# Patient Record
Sex: FEMALE | Race: ASIAN | NOT HISPANIC OR LATINO | Employment: PART TIME | URBAN - METROPOLITAN AREA
[De-identification: names, ages, dates, MRNs, and addresses within clinical notes are randomized per-mention and may not be internally consistent; named-entity substitution may affect disease eponyms.]

---
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
## 2019-06-13 ENCOUNTER — HOSPITAL ENCOUNTER (OUTPATIENT)
Dept: ULTRASOUND IMAGING | Facility: HOSPITAL | Age: 19
Discharge: HOME | End: 2019-06-13
Attending: PHYSICIAN ASSISTANT

## 2019-06-13 ENCOUNTER — PRENATAL OFFICE VISIT - HEALTHEAST (OUTPATIENT)
Dept: FAMILY MEDICINE | Facility: CLINIC | Age: 19
End: 2019-06-13

## 2019-06-13 DIAGNOSIS — Z32.01 PREGNANCY TEST POSITIVE: ICD-10-CM

## 2019-06-13 DIAGNOSIS — Z34.00 SUPERVISION OF NORMAL FIRST PREGNANCY, ANTEPARTUM: ICD-10-CM

## 2019-06-13 DIAGNOSIS — N92.6 ABNORMAL MENSES: ICD-10-CM

## 2019-06-13 LAB
ALBUMIN UR-MCNC: NEGATIVE MG/DL
AMPHETAMINES UR QL SCN: NORMAL
BARBITURATES UR QL: NORMAL
BASOPHILS # BLD AUTO: 0 THOU/UL (ref 0–0.2)
BASOPHILS NFR BLD AUTO: 0 % (ref 0–2)
BENZODIAZ UR QL: NORMAL
CANNABINOIDS UR QL SCN: NORMAL
COCAINE UR QL: NORMAL
COLLECTION METHOD: NORMAL
CREAT UR-MCNC: 48.4 MG/DL
EOSINOPHIL # BLD AUTO: 0.1 THOU/UL (ref 0–0.4)
EOSINOPHIL NFR BLD AUTO: 1 % (ref 0–6)
ERYTHROCYTE [DISTWIDTH] IN BLOOD BY AUTOMATED COUNT: 13.1 % (ref 11–14.5)
GLUCOSE UR STRIP-MCNC: NEGATIVE MG/DL
HBV SURFACE AG SERPL QL IA: NEGATIVE
HCG UR QL: POSITIVE
HCT VFR BLD AUTO: 40.1 % (ref 35–47)
HGB BLD-MCNC: 13.4 G/DL (ref 12–16)
HIV 1+2 AB+HIV1 P24 AG SERPL QL IA: NEGATIVE
KETONES UR STRIP-MCNC: NEGATIVE MG/DL
LEAD BLD-MCNC: NORMAL UG/DL
LEAD RETEST: NO
LYMPHOCYTES # BLD AUTO: 2.2 THOU/UL (ref 0.8–4.4)
LYMPHOCYTES NFR BLD AUTO: 21 % (ref 20–40)
MCH RBC QN AUTO: 31.5 PG (ref 27–34)
MCHC RBC AUTO-ENTMCNC: 33.4 G/DL (ref 32–36)
MCV RBC AUTO: 94 FL (ref 80–100)
MONOCYTES # BLD AUTO: 0.7 THOU/UL (ref 0–0.9)
MONOCYTES NFR BLD AUTO: 6 % (ref 2–10)
NEUTROPHILS # BLD AUTO: 7.6 THOU/UL (ref 2–7.7)
NEUTROPHILS NFR BLD AUTO: 72 % (ref 50–70)
OPIATES UR QL SCN: NORMAL
OXYCODONE UR QL: NORMAL
PCP UR QL SCN: NORMAL
PLATELET # BLD AUTO: 219 THOU/UL (ref 140–440)
PMV BLD AUTO: 10.6 FL (ref 8.5–12.5)
RBC # BLD AUTO: 4.25 MILL/UL (ref 3.8–5.4)
WBC: 10.6 THOU/UL (ref 4–11)

## 2019-06-14 LAB
ABO/RH(D): NORMAL
ABORH REPEAT: NORMAL
ANTIBODY SCREEN: NEGATIVE
BACTERIA SPEC CULT: NO GROWTH
LEAD BLDV-MCNC: <2 UG/DL (ref 0–4.9)
RUBV IGG SERPL QL IA: POSITIVE
T PALLIDUM AB SER QL: NEGATIVE

## 2019-07-02 ENCOUNTER — PRENATAL OFFICE VISIT - HEALTHEAST (OUTPATIENT)
Dept: FAMILY MEDICINE | Facility: CLINIC | Age: 19
End: 2019-07-02

## 2019-07-02 ENCOUNTER — RECORDS - HEALTHEAST (OUTPATIENT)
Dept: ADMINISTRATIVE | Facility: OTHER | Age: 19
End: 2019-07-02

## 2019-07-02 DIAGNOSIS — Z34.01 SUPERVISION OF NORMAL FIRST PREGNANCY IN FIRST TRIMESTER: ICD-10-CM

## 2019-07-02 DIAGNOSIS — Z36.9 ANTENATAL SCREENING ENCOUNTER: ICD-10-CM

## 2019-07-02 LAB
ALBUMIN UR-MCNC: NEGATIVE MG/DL
GLUCOSE UR STRIP-MCNC: NEGATIVE MG/DL
KETONES UR STRIP-MCNC: NEGATIVE MG/DL

## 2019-07-02 ASSESSMENT — MIFFLIN-ST. JEOR: SCORE: 1437.87

## 2019-07-03 ENCOUNTER — HOSPITAL ENCOUNTER (OUTPATIENT)
Dept: ULTRASOUND IMAGING | Facility: HOSPITAL | Age: 19
Discharge: HOME | End: 2019-07-03
Attending: FAMILY MEDICINE

## 2019-07-03 DIAGNOSIS — Z34.01 SUPERVISION OF NORMAL FIRST PREGNANCY IN FIRST TRIMESTER: ICD-10-CM

## 2019-07-03 LAB
C TRACH DNA SPEC QL PROBE+SIG AMP: NEGATIVE
N GONORRHOEA DNA SPEC QL NAA+PROBE: NEGATIVE

## 2019-07-05 ENCOUNTER — COMMUNICATION - HEALTHEAST (OUTPATIENT)
Dept: FAMILY MEDICINE | Facility: CLINIC | Age: 19
End: 2019-07-05

## 2019-07-06 LAB
# FETUSES US: NORMAL
AFP MOM SERPL: 2.09
AFP SERPL-MCNC: 101 NG/ML
AGE - REPORTED: 20.8 YR
CURRENT SMOKER: NO
FAMILY MEMBER DISEASES HX: NO
GA METHOD: NORMAL
GA: NORMAL WK
IDDM PATIENT QL: NO
INTEGRATED SCN PATIENT-IMP: NORMAL
SPECIMEN DRAWN SERPL: NORMAL

## 2019-07-30 ENCOUNTER — PRENATAL OFFICE VISIT - HEALTHEAST (OUTPATIENT)
Dept: FAMILY MEDICINE | Facility: CLINIC | Age: 19
End: 2019-07-30

## 2019-07-30 DIAGNOSIS — Z34.02 SUPERVISION OF NORMAL FIRST PREGNANCY IN SECOND TRIMESTER: ICD-10-CM

## 2019-08-27 ENCOUNTER — PRENATAL OFFICE VISIT - HEALTHEAST (OUTPATIENT)
Dept: FAMILY MEDICINE | Facility: CLINIC | Age: 19
End: 2019-08-27

## 2019-08-27 DIAGNOSIS — Z34.03 SUPERVISION OF NORMAL FIRST PREGNANCY IN THIRD TRIMESTER: ICD-10-CM

## 2019-09-24 ENCOUNTER — PRENATAL OFFICE VISIT - HEALTHEAST (OUTPATIENT)
Dept: FAMILY MEDICINE | Facility: CLINIC | Age: 19
End: 2019-09-24

## 2019-09-24 DIAGNOSIS — Z34.93 SUPERVISION OF NORMAL PREGNANCY IN THIRD TRIMESTER: ICD-10-CM

## 2019-09-24 LAB
ALBUMIN UR-MCNC: NEGATIVE MG/DL
FASTING STATUS PATIENT QL REPORTED: NO
GLUCOSE 1H P 50 G GLC PO SERPL-MCNC: 149 MG/DL (ref 70–139)
GLUCOSE UR STRIP-MCNC: NEGATIVE MG/DL
HGB BLD-MCNC: 12.8 G/DL (ref 12–16)
KETONES UR STRIP-MCNC: NEGATIVE MG/DL

## 2019-09-24 ASSESSMENT — MIFFLIN-ST. JEOR: SCORE: 1524.68

## 2019-09-25 ENCOUNTER — COMMUNICATION - HEALTHEAST (OUTPATIENT)
Dept: FAMILY MEDICINE | Facility: CLINIC | Age: 19
End: 2019-09-25

## 2019-09-25 LAB — T PALLIDUM AB SER QL: NEGATIVE

## 2019-09-27 ENCOUNTER — AMBULATORY - HEALTHEAST (OUTPATIENT)
Dept: LAB | Facility: CLINIC | Age: 19
End: 2019-09-27

## 2019-09-27 ENCOUNTER — COMMUNICATION - HEALTHEAST (OUTPATIENT)
Dept: FAMILY MEDICINE | Facility: CLINIC | Age: 19
End: 2019-09-27

## 2019-09-27 DIAGNOSIS — Z34.93 SUPERVISION OF NORMAL PREGNANCY IN THIRD TRIMESTER: ICD-10-CM

## 2019-09-27 LAB
FASTING STATUS PATIENT QL REPORTED: YES
GLUCOSE 1H P 100 G GLC PO SERPL-MCNC: 151 MG/DL
GLUCOSE 2H P 100 G GLC PO SERPL-MCNC: 127 MG/DL
GLUCOSE 3H P 100 G GLC PO SERPL-MCNC: 143 MG/DL
GLUCOSE P FAST SERPL-MCNC: 87 MG/DL

## 2019-10-22 ENCOUNTER — PRENATAL OFFICE VISIT - HEALTHEAST (OUTPATIENT)
Dept: FAMILY MEDICINE | Facility: CLINIC | Age: 19
End: 2019-10-22

## 2019-10-22 DIAGNOSIS — Z34.93 THIRD TRIMESTER PREGNANCY: ICD-10-CM

## 2019-10-22 LAB
ALBUMIN UR-MCNC: NEGATIVE MG/DL
APPEARANCE UR: CLEAR
BACTERIA #/AREA URNS HPF: ABNORMAL HPF
BILIRUB UR QL STRIP: NEGATIVE
COLOR UR AUTO: YELLOW
GLUCOSE UR STRIP-MCNC: NEGATIVE MG/DL
HGB UR QL STRIP: NEGATIVE
KETONES UR STRIP-MCNC: NEGATIVE MG/DL
LEUKOCYTE ESTERASE UR QL STRIP: ABNORMAL
NITRATE UR QL: NEGATIVE
PH UR STRIP: 7 [PH] (ref 5–8)
RBC #/AREA URNS AUTO: ABNORMAL HPF
SP GR UR STRIP: 1.02 (ref 1–1.03)
SQUAMOUS #/AREA URNS AUTO: ABNORMAL LPF
UROBILINOGEN UR STRIP-ACNC: ABNORMAL
WBC #/AREA URNS AUTO: ABNORMAL HPF

## 2019-10-22 ASSESSMENT — MIFFLIN-ST. JEOR: SCORE: 1538.8

## 2019-10-23 ENCOUNTER — HOSPITAL ENCOUNTER (OUTPATIENT)
Dept: ULTRASOUND IMAGING | Facility: HOSPITAL | Age: 19
Discharge: HOME | End: 2019-10-23
Attending: FAMILY MEDICINE

## 2019-10-23 ENCOUNTER — COMMUNICATION - HEALTHEAST (OUTPATIENT)
Dept: FAMILY MEDICINE | Facility: CLINIC | Age: 19
End: 2019-10-23

## 2019-10-23 DIAGNOSIS — Z34.93 THIRD TRIMESTER PREGNANCY: ICD-10-CM

## 2019-10-30 ENCOUNTER — PRENATAL OFFICE VISIT - HEALTHEAST (OUTPATIENT)
Dept: FAMILY MEDICINE | Facility: CLINIC | Age: 19
End: 2019-10-30

## 2019-10-30 DIAGNOSIS — Z34.93 SUPERVISION OF NORMAL PREGNANCY IN THIRD TRIMESTER: ICD-10-CM

## 2019-10-31 LAB
ALLERGIC TO PENICILLIN: NO
GP B STREP DNA SPEC QL NAA+PROBE: NEGATIVE

## 2019-11-07 ENCOUNTER — PRENATAL OFFICE VISIT - HEALTHEAST (OUTPATIENT)
Dept: FAMILY MEDICINE | Facility: CLINIC | Age: 19
End: 2019-11-07

## 2019-11-07 DIAGNOSIS — Z34.93 SUPERVISION OF NORMAL PREGNANCY IN THIRD TRIMESTER: ICD-10-CM

## 2019-11-07 DIAGNOSIS — K59.00 CONSTIPATION, UNSPECIFIED CONSTIPATION TYPE: ICD-10-CM

## 2019-11-10 ENCOUNTER — HOME CARE/HOSPICE - HEALTHEAST (OUTPATIENT)
Dept: HOME HEALTH SERVICES | Facility: HOME HEALTH | Age: 19
End: 2019-11-10

## 2019-11-12 ENCOUNTER — HOME CARE/HOSPICE - HEALTHEAST (OUTPATIENT)
Dept: HOME HEALTH SERVICES | Facility: HOME HEALTH | Age: 19
End: 2019-11-12

## 2020-01-13 ENCOUNTER — OFFICE VISIT - HEALTHEAST (OUTPATIENT)
Dept: FAMILY MEDICINE | Facility: CLINIC | Age: 20
End: 2020-01-13

## 2021-05-26 VITALS
DIASTOLIC BLOOD PRESSURE: 62 MMHG | TEMPERATURE: 97.8 F | OXYGEN SATURATION: 98 % | SYSTOLIC BLOOD PRESSURE: 114 MMHG | RESPIRATION RATE: 16 BRPM | HEART RATE: 88 BPM

## 2021-05-29 NOTE — PROGRESS NOTES
Chief Complaint:  Chief Complaint   Patient presents with     Pregnancy confirmation     HPI:   Holly Harrell is a 19 y.o. female is here for pregnancy intake.  She is .  Patient's last menstrual period was 01/15/2019.  Negative home pregnancy test in 2019, positive home test in March.  Saw Dr. Almonte in January for leg pain, followed up With rheumatology in May.  Rheumatology work-up grossly negative.  She took diclofenac for her leg pain at some point over the past several months, has stopped now.  She reports her leg pain has improved.    Past medical history: reviewed and updated.  No past medical history on file.  History reviewed. No pertinent surgical history.    Current Outpatient Medications:      prenat.vits,kavya,min-iron-folic Tab, Take 1 tablet by mouth daily., Disp: 100 each, Rfl: 3    Social:  Social History     Tobacco Use     Smoking status: Never Smoker     Smokeless tobacco: Never Used   Substance Use Topics     Alcohol use: Not on file     Drug use: Not on file       OBJECTIVE:  No Known Allergies  Vitals:    19 1119   BP: 115/62   Pulse: 88   Resp: 14     Body mass index is 27.9 kg/m .    Vital signs stable as recorded above  General: Patient is alert and oriented x 3, in no apparent distress  Fetal Exam: Fundal height was palpable at 17 cm, fetal heart tones are heard at 145 bpm.  Patient reports no fetal movement.    Results:  Results for orders placed or performed in visit on 19   Culture, Urine   Result Value Ref Range    Culture No Growth    Pregnancy (Beta-hCG, Qual), Urine   Result Value Ref Range    Pregnancy Test, Urine Positive (!) Negative   ABO/RH Typing (OP order)   Result Value Ref Range    HML ABO/Rh Typing B POS     HML ABO/Rh Repeat Typing B POS    Hepatitis B Surface antigen (HBsAG)   Result Value Ref Range    Hepatitis B Surface Ag Negative Negative   HIV Antigen/Antibody Screening Cascade   Result Value Ref Range    HIV Antigen / Antibody Negative Negative    HML Antibody Screen   Result Value Ref Range    HML Antibody Screen Negative Negative   Lead, Blood   Result Value Ref Range    Lead  <5.0 ug/dL    Collection Method Venous     Lead Retest No    RPR   Result Value Ref Range    Treponema Antibody (Syphilis) Negative Negative   Rubella Immune Status (IgG)   Result Value Ref Range    Rubella Antibody, IgG Positive    Drugs of Abuse 1,Urine   Result Value Ref Range    Amphetamines Screen Negative Screen Negative    Benzodiazepines Screen Negative Screen Negative    Opiates Screen Negative Screen Negative    Phencyclidine Screen Negative Screen Negative    THC Screen Negative Screen Negative    Barbiturates Screen Negative Screen Negative    Cocaine Metabolite Screen Negative Screen Negative    Oxycodone Screen Negative Screen Negative    Creatinine, Urine 48.4 mg/dL   Urinalysis, OB Screen   Result Value Ref Range    Glucose, UA Negative Negative    Ketones, UA Negative Negative    Protein, UA Negative Negative mg/dL   HM1 (CBC with Diff)   Result Value Ref Range    WBC 10.6 4.0 - 11.0 thou/uL    RBC 4.25 3.80 - 5.40 mill/uL    Hemoglobin 13.4 12.0 - 16.0 g/dL    Hematocrit 40.1 35.0 - 47.0 %    MCV 94 80 - 100 fL    MCH 31.5 27.0 - 34.0 pg    MCHC 33.4 32.0 - 36.0 g/dL    RDW 13.1 11.0 - 14.5 %    Platelets 219 140 - 440 thou/uL    MPV 10.6 8.5 - 12.5 fL    Neutrophils % 72 (H) 50 - 70 %    Lymphocytes % 21 20 - 40 %    Monocytes % 6 2 - 10 %    Eosinophils % 1 0 - 6 %    Basophils % 0 0 - 2 %    Neutrophils Absolute 7.6 2.0 - 7.7 thou/uL    Lymphocytes Absolute 2.2 0.8 - 4.4 thou/uL    Monocytes Absolute 0.7 0.0 - 0.9 thou/uL    Eosinophils Absolute 0.1 0.0 - 0.4 thou/uL    Basophils Absolute 0.0 0.0 - 0.2 thou/uL   Lead, Blood, Venouos   Result Value Ref Range    Lead, Blood (Venous) <2.0 0.0 - 4.9 ug/dL     Other screening pregnancy lab work is ordered and pending.    Patient scored a 2/30 on [place]  Depression Screen.    Assessment and Plan:  1.  Pregnancy Intake Appointment.  Holly Harrell is 19 y.o. and .  Patient's last menstrual period was 01/15/2019.  Estimated Date of Delivery: 19  She will be seeing Dr. Wells for OB care.  Screening pregnancy lab work was drawn.  Prenatal vitamins prescribed.  Problem list and current medications reviewed and updated.  Dating ultrasound ordered.  Prenatal info packet given.    2. History of bilateral leg pain.  Seen by rheumatology, generally negative work-up.  Symptoms resolving.    Follow up in 2 weeks.  Please see OB Episode for complete details.  Visit was approximately 30 minutes, greater than 50% of time spent in face-to-face counseling and coordination of care.    This dictation uses voice recognition software, which may contain typographical errors.

## 2021-05-29 NOTE — PATIENT INSTRUCTIONS - HE
Pregnancy: about 20-21 weeks    Due Date: October 22, 2019    Next visit in 2 weeks  With Dr. Wells  For Your First OB Visit

## 2021-05-30 NOTE — TELEPHONE ENCOUNTER
Called and spoke with pt , Message was given, pt understood, no further questions.  Use  line to contact :Marcel ID:[id number]

## 2021-05-30 NOTE — PATIENT INSTRUCTIONS - HE
"  Patient Education   HEALTHY PREGNANCY CARE: 18-22 WEEKS PREGNANT    Your baby is continuing to develop quickly. At this stage, babies can now suck their thumbs,  firmly with their hands, and are beginning to hear.    Sometime between 18 and 22 weeks, you will start to feel your baby move. At first, these small fetal movements feel like fluttering or \"butterflies.\" Some women say that they feel like gas bubbles. As the baby grows, these movements will become stronger and be able to be felt through your abdomen.     Nutrition: During this time, you may find that your nausea and fatigue are gone. Overall, you may feel better and have more energy than you did in your first trimester. Be sure you are getting enough calcium and iron in your diet. Your prenatal vitamins cannot supply all of the nutrients you need, so continue to eat 3-4 servings of dairy foods and 2-3 servings of meat/fish/poultry/nuts every day. Foods high in iron include: red meats, eggs, dark green vegetables, dark yellow vegetables, nuts, kidney beans and chickpeas. Some cereals are fortified with iron, so look at the food labels for 100% of the daily requirement for iron.     [place] for childbirth and parenting classes, including an infant CPR class. Breastfeeding classes are recommended too.    Plan for the gestational diabetes screening between weeks 24-28. You can eat normally before that visit; we would suggest making sure you have protein foods, but not a lot of carbohydrates or sugary foods.    Your blood type was determined at your first OB appointment. If you are Rh negative, you should discuss the need for a Rhogam shot with your midwife or physician.     If you had a  birth in the past, discuss a trial of labor with your midwife or physician. He or she may ask that you obtain your operative report from that  if you are wanting to have a vaginal birth after  () this time.     Think about the support you " have, and what help you can plan on from family and friends, after your baby is born. Many mothers and babies are ready to go home from the hospital within a few days. Your clinic staff is available to assist you and the [facility] staff is available to you after hours. Start preparing your other children for changes they'll experience with the new baby. Explore day care options.    You may find that you have new discomforts now, such as sleep problems or leg cramps. To access information that can help you ease these discomforts, you can refer to the Starting Out Right book or find it online at [email or web address]://www.[employer]org/images/stories/maternity/[facility]-Starting-Out-Right.pdf or http://www.[employer]org/images/stories/flipbooks/[facility]-starting-out-right/[facility]-starting-out-right.html#p=8    You can sign up for a weekly parenting e-mail that gives support, tips and advice from health care professionals that starts with pregnancy and continues through the toddler years. To register, go to [email or web address] at any time during your pregnancy.    Watch for the warning signs of premature labor:     Dull, low backache    Contractions of the uterus, menstrual-like cramps    Abdominal cramping with or without diarrhea    More pelvic pressure    Increase or change in vaginal discharge.     Remember that labor doesn't have to hurt. Never hesitate to call your midwife or physician or their staff at [facility] FAM[facility] MEDICINE/OB at Phone: [phone] for any one of these warning signs - or if something just doesn't feel right. If it's after clinic hours, physician patients should call the [facility] at [phone] ([phone]); midwife patients should call their answering service at [phone].

## 2021-05-30 NOTE — TELEPHONE ENCOUNTER
Attempted #2 voice mail is full. Unable to leave message.   Use  line to contact :Marco A ID:[id number]    Please relay both messages to patient.     Test results suggest normal chromosomes for those tested (21, 18, 13).   Sex chromosome also normal (Boy if patient interested in knowing.)     Notes recorded by Andres Wells MD on 7/8/2019 at 8:09 AM CDT  Call:  The screening blood test for spine problems was normal (low risk).

## 2021-05-30 NOTE — PROGRESS NOTES
Discussed screening for aneuploidy and neural tube defects. Patient wanted to proceed with cell free DNA and MSAFP.    Reviewed intake exam, labs, MEGAN, past medical history, past surgical history, family history, genetic history, and previous obstetrical history.     Thinks 18 lb weight gain already.  Advised 20-30 lb weight gain goal for pregnancy.  Advised exercise.  Discussed reducing calories a little.  Does not take PNV.  Discussed iron and folate intake.    GPs and FOB available to help her with baby.  She does not work or go to school now.

## 2021-05-30 NOTE — TELEPHONE ENCOUNTER
----- Message from Andres Wells MD sent at 7/3/2019  3:22 PM CDT -----  Call:  Ultrasound looks good.

## 2021-05-30 NOTE — PATIENT INSTRUCTIONS - HE
"  Patient Education   HEALTHY PREGNANCY CARE: 18-22 WEEKS PREGNANT    Your baby is continuing to develop quickly. At this stage, babies can now suck their thumbs,  firmly with their hands, and are beginning to hear.    Sometime between 18 and 22 weeks, you will start to feel your baby move. At first, these small fetal movements feel like fluttering or \"butterflies.\" Some women say that they feel like gas bubbles. As the baby grows, these movements will become stronger and be able to be felt through your abdomen.     Nutrition: During this time, you may find that your nausea and fatigue are gone. Overall, you may feel better and have more energy than you did in your first trimester. Be sure you are getting enough calcium and iron in your diet. Your prenatal vitamins cannot supply all of the nutrients you need, so continue to eat 3-4 servings of dairy foods and 2-3 servings of meat/fish/poultry/nuts every day. Foods high in iron include: red meats, eggs, dark green vegetables, dark yellow vegetables, nuts, kidney beans and chickpeas. Some cereals are fortified with iron, so look at the food labels for 100% of the daily requirement for iron.     [place] for childbirth and parenting classes, including an infant CPR class. Breastfeeding classes are recommended too.    Plan for the gestational diabetes screening between weeks 24-28. You can eat normally before that visit; we would suggest making sure you have protein foods, but not a lot of carbohydrates or sugary foods.    Your blood type was determined at your first OB appointment. If you are Rh negative, you should discuss the need for a Rhogam shot with your midwife or physician.     If you had a  birth in the past, discuss a trial of labor with your midwife or physician. He or she may ask that you obtain your operative report from that  if you are wanting to have a vaginal birth after  () this time.     Think about the support you " have, and what help you can plan on from family and friends, after your baby is born. Many mothers and babies are ready to go home from the hospital within a few days. Your clinic staff is available to assist you and the [facility] staff is available to you after hours. Start preparing your other children for changes they'll experience with the new baby. Explore day care options.    You may find that you have new discomforts now, such as sleep problems or leg cramps. To access information that can help you ease these discomforts, you can refer to the Starting Out Right book or find it online at http://www.[employer].org/images/stories/maternity/[facility]-Starting-Out-Right.pdf or http://www.[employer].org/images/stories/flipbooks/[facility]-starting-out-right/[facility]-starting-out-right.html#p=8    You can sign up for a weekly parenting e-mail that gives support, tips and advice from health care professionals that starts with pregnancy and continues through the toddler years. To register, go to [email or web address] at any time during your pregnancy.    Watch for the warning signs of premature labor:     Dull, low backache    Contractions of the uterus, menstrual-like cramps    Abdominal cramping with or without diarrhea    More pelvic pressure    Increase or change in vaginal discharge.     Remember that labor doesn't have to hurt. Never hesitate to call your midwife or physician or their staff at [facility] FAM[facility] MEDICINE/OB at Phone: [phone] for any one of these warning signs - or if something just doesn't feel right. If it's after clinic hours, physician patients should call the [facility] at [phone] ([phone]); midwife patients should call their answering service at [phone].

## 2021-05-30 NOTE — PROGRESS NOTES
23w1d  No ctx, lof, bleeding, or dc.  No HA or vision changes.   Reviewed ultrasound and genetic tests.    /68   Pulse 82   Resp 12   Wt 165 lb (74.8 kg)   LMP 01/15/2019   BMI 29.70 kg/m       No concerns.  Plans to breast feed.

## 2021-05-31 VITALS — WEIGHT: 133 LBS | BODY MASS INDEX: 23.57 KG/M2 | HEIGHT: 63 IN

## 2021-05-31 NOTE — PROGRESS NOTES
27w1d   No ctx, lof, bleeding, or dc.  No HA or vision changes.   /77 (Patient Site: Right Arm, Patient Position: Sitting, Cuff Size: Adult Regular)   Pulse 97   Wt 173 lb (78.5 kg)   LMP 01/15/2019   BMI 31.14 kg/m      1. Supervision of normal first pregnancy in third trimester  Cannot stay for GCT today, so next visit.  - Urinalysis, OB Screen

## 2021-05-31 NOTE — PATIENT INSTRUCTIONS - HE
Patient Education   HEALTHY PREGNANCY CARE: 26-30 WEEKS PREGNANT    You are now in your last trimester of pregnancy. Your baby is growing rapidly, can open and close her eyelids, sometimes get hiccups, and you'll probably feel her moving around more often. Your baby has breathing movements and is gaining about one ounce each day. You may notice heartburn and leg cramps. Your back may ache as your body gets used to your baby's size and length.    Discuss your work situation with your midwife or physician as needed. If you stand for long periods of time, you may need to make changes and take breaks.    Pre-register online at the hospital where your baby will be born ([email or web address].[facility].[email or web address]/maternity/maternity-care-[email or web address])     Be aware of your baby's activity level. You may be asked to do daily fetal movement counts. Contact your midwife or physician about any decreased movement.    You may have been tested for gestational diabetes today. If you are RH negative, you may have had an additional test and a Rhogam injection.    Consider receiving a Tdap vaccination to protect your baby from Pertussis/whooping cough.    If you are considering tubal ligation, discuss this with your midwife or physician. You will need to have an appointment with the obstetrician who will do the surgery to discuss the risks, benefits, and alternatives, and to sign the consent. This can be discussed at your next visit.    Continue to watch for any signs or symptoms of premature labor:     Regular contractions. This means having about 6 or more within 1 hour, even after you have had a glass of water and are resting.     A backache that starts and stops regularly.    An increase or change in vaginal discharge, such as heavy, mucus-like, watery, or bloody discharge.     Your water breaks or leaks.    Continue to watch for signs and symptoms of preeclampsia:     Sudden swelling of your face, hands, or feet     New vision  problems such as blurring, double vision, or flashing lights    A severe headache not relieved with acetaminophen (Tylenol)    Sharp or stabbing pain in your right or middle upper abdomen    If you have any of the above symptoms or any other concerns, call your midwife or physician or their clinic staff at [facility] MEDICINE/OB at Phone: [phone]. If it's after clinic hours, physician patients should call the [facility] at [phone] ([phone]); midwife patients should call their answering service at [phone].    How can you care for yourself at home?   You can refer to the Starting Out Right book or find it online at http://www.[facility].org/images/stories/maternity/[facility]-Starting-Out-Right.pdf or http://www.[facility].org/images/stories/flipbooks/[facility]-starting-out-right/[facility]-starting-out-right.html#p=8     You can sign up for a weekly parenting e-mail that gives support, tips and advice from health care professionals that starts with pregnancy and continues through the toddler years. To register, go to [email or web address] at any time during your pregnancy.      Baby Feeding in the Hospital: Information, Support and Resources    As you prepare for the birth of your child, you will want to consider options for feeding your baby including breast-feeding and/or baby formula. The American Academy of Pediatrics recommends exclusive breast-feeding for the first six months (although any amount of breast-feeding is beneficial).  However, we also understand that breast-feeding is a personal choice and not for everyone. Whether or not you choose to breast-feed, your decision will be respected by our staff.    There are numerous benefits of breast-feeding; here are a few to consider:    Provides antibodies to protect your baby from infections and diseases    The cost: formula can cost over $1,500 per year    Convenience, no warming up or sterilizing bottles and supplies    The  physical contact with breastfeeding can make babies feel secure, warm and comforted    What ever my feeding choice, what can I expect after I deliver my baby?    Your baby will usually be placed skin-to-skin immediately following birth. The skin to skin contact between you and your baby will be a special and memorable time. The bonding and attachment comforts your baby and has a positive effect on baby s brain development.     Having your baby  room in  with you also helps you start to learn your baby s body rhythms and sleep cycle.      You will also begin to learn your baby s cues (signals) that he or she is ready to feed.    When do I start to feed my baby?  As soon as possible after your baby s birth, you will be encouraged to begin feeding.  In the first couple of weeks, your baby will eat often.  Breastfeeding babies usually eat at least 8 times in 24 hours.  Babies fed formula usually eat at least 7 times in 24 hours.      Breast-feeding tips:    Get comfortable and use pillows for support.    Have your baby at the level of your breast, facing you,  tummy to tummy .      Touch your nipple to your baby s lips so you baby s mouth opens wide (rooting reflex).  Aim the nipple toward the roof of your baby s mouth. When your baby opens his or her mouth, pull your baby toward your breast to help your baby  latch on  to your nipple and much of the areola area.    Hand expressing your breast milk can assist with latching your baby to your breast, if needed.    Ask for help, breastfeeding may seem awkward or uncomfortable at first, this is normal. There are numerous resources available at [facility] and beyond.     If your goal is to exclusively breastfeed, avoid using any formula or artificial nipples (including bottles and pacifiers) while you are your baby are learning to breastfeed unless there is a medical reason.       Mixing breastfeeding and formula can interfere with how you begin building  your milk supply.  It can impact how you and your baby  learn  to breastfeeding together and alter the natural growth of  good  bacteria in your baby s stomach.    Delay a pacifier or a bottle in the first few weeks until breastfeeding is well established. This is often around 3 weeks of age.    Ask your nurse to show you how to hand express.   Breast milk can be kept in the refrigerator or freezer for later use.  (over)  Hospital Resources:  [facility] Lactation Clinics located at [facility], [facility] and [facility]  Call: [phone].    Inpatient support    Outpatient appointments    Telephone consultation    Breast-feeding classes available through [employer]      Online Resources:    [email or web address] sign up for free online weekly e-mail    [email or web address]/maternity    Breastfeedingmadesimple.com    [employer].meebee (La Leche League)    Normalfed.com    Womenshealth.gov/breastfeeding    Workandpump.com    Breast-feeding Supplies & Pumps:  Talk to your insurance provider or WIC (Women, Infants and Children) to learn more about options available to you. Recent health insurance changes may include additional coverage for supplies and pumps.    Public Health:  Women, Infants and Children Nutrition program (WIC): provides breast-feeding support and education in addition to formal feeding moms. 426-YWM-2521 or http://www.health.[facility].mn.us/divs/fh/wic    Family Health Home Visiting: Public [facility] Nurse home visits are available. Talk to your provider to see if you qualify. Most [facility] have a program available.    Additional Resources:  La Leche League is an international, nonprofit, nonsectarian organization offering information, education, and support to mothers who want to breast-feed their babies. Local groups offer phone help and monthly meetings. Visit [employer]meebee or [employer].org and us the  Find local support  drop down menu or click on the  Resources   tab.    [place] Breastfeeding Resources: 9-413-297-BABY (9809) toll free    National Breastfeeding Help Line trained breastfeeding peer counselors can help answer common breast-feeding questions by phone. Monday-Friday: English/Irish  3-526- 310-3553 toll free, 1-383.842.9538 (TTY)    [facility] Connection: [phone] ([phone])

## 2021-06-01 VITALS — HEIGHT: 63 IN | BODY MASS INDEX: 22.68 KG/M2 | WEIGHT: 128 LBS

## 2021-06-01 VITALS — BODY MASS INDEX: 24.3 KG/M2 | WEIGHT: 135 LBS

## 2021-06-01 NOTE — TELEPHONE ENCOUNTER
Called and spoke with pt , Message was given, pt understood, no further questions.  Use  line to contact :[facility] ID:[id number]    Appointment made to come in.

## 2021-06-01 NOTE — TELEPHONE ENCOUNTER
"Attempt to call pt, Voice mail full unable to leave message. Will try again later. #2  Use  line to contact :Basilio ID:[id number]    \" Okay to relay message\"      "

## 2021-06-01 NOTE — PROGRESS NOTES
"31w1d   No ctx, lof, bleeding, or dc.  No HA or vision changes.     /58 (Patient Site: Left Arm, Patient Position: Sitting, Cuff Size: Adult Regular)   Pulse 96   Resp 20   Ht 5' 2.83\" (1.596 m)   Wt 176 lb (79.8 kg)   LMP 01/15/2019   BMI 31.35 kg/m       Recent Results (from the past 240 hour(s))   Glucose,Gestational Challenge (1 Hour)    Collection Time: 09/24/19  9:07 AM   Result Value Ref Range    Glucose, Gestational Challenge 1hr 149 (H) 70 - 139 mg/dL    Patient Fasting > 8hrs? No    Hemoglobin    Collection Time: 09/24/19  9:07 AM   Result Value Ref Range    Hemoglobin 12.8 12.0 - 16.0 g/dL   Syphilis Screen, [place]    Collection Time: 09/24/19  9:07 AM   Result Value Ref Range    Treponema Antibody (Syphilis) Negative Negative   Urinalysis, OB Screen    Collection Time: 09/24/19  9:07 AM   Result Value Ref Range    Glucose, UA Negative Negative    Ketones, UA Negative Negative    Protein, UA Negative Negative mg/dL   Glucose, Gestational Challenge Fasting    Collection Time: 09/27/19  8:05 AM   Result Value Ref Range    Glucose, Fasting 87 60-<95 mg/dL    Patient Fasting > 8hrs? Yes    Glucose, Gestational Challenge 1 Hour    Collection Time: 09/27/19  9:10 AM   Result Value Ref Range    Glucose, 1 Hour 151 60-<180 mg/dL   Glucose, Gestational Challenge 2 Hour    Collection Time: 09/27/19 10:10 AM   Result Value Ref Range    Glucose, 2 Hour 127 60-<155 mg/dL   Glucose, Gestational Challenge 3 Hour    Collection Time: 09/27/19 11:10 AM   Result Value Ref Range    Glucose, 3 hour 143 (H) 60-<140 mg/dL     1. Supervision of normal pregnancy in third trimester  - Glucose,Gestational Challenge (1 Hour)  - Hemoglobin  - Syphilis Screen, [place]  - Urinalysis, OB Screen  - Influenza, Seasonal [place], PF =/> 6months  - Tdap vaccine greater than or equal to 8yo IM  - Glucose,Gestational Challenge (3 Hour); Future   "

## 2021-06-01 NOTE — TELEPHONE ENCOUNTER
----- Message from Andres Wells MD sent at 9/25/2019  9:42 AM CDT -----  Call:  Glucose tests was too high.  We need to do another test to check for diabetes during pregnancy.  Needs to schedule fasting 3 hour tests.

## 2021-06-01 NOTE — TELEPHONE ENCOUNTER
Patient Returning Call  Reason for call:  Message from clinic  Information relayed to patient:  Message from Andres Wells MD sent at 9/27/2019  2:51 PM CDT -----  Call:  Good news.  You do not have diabetes based on this test.  Exercise is still important.  Would also advise decreasing intake of rice and noodles  Patient has additional questions:  No  If YES, what are your questions/concerns:  n/a  Okay to leave a detailed message?: No call back needed

## 2021-06-01 NOTE — TELEPHONE ENCOUNTER
"Use  line to contact  ID:[id number]  Called and left message for pt to return call.# 1  \" Okay to relay message\"      "

## 2021-06-01 NOTE — TELEPHONE ENCOUNTER
----- Message from Andres Wells MD sent at 9/27/2019  2:51 PM CDT -----  Call:  Good news.  You do not have diabetes based on this test.  Exercise is still important.  Would also advise decreasing intake of rice and noodles.

## 2021-06-02 ENCOUNTER — OFFICE VISIT - HEALTHEAST (OUTPATIENT)
Dept: FAMILY MEDICINE | Facility: CLINIC | Age: 21
End: 2021-06-02

## 2021-06-02 DIAGNOSIS — Z12.4 SCREENING FOR CERVICAL CANCER: ICD-10-CM

## 2021-06-02 DIAGNOSIS — N92.6 IRREGULAR MENSES: ICD-10-CM

## 2021-06-02 DIAGNOSIS — Z00.00 ROUTINE HEALTH MAINTENANCE: ICD-10-CM

## 2021-06-02 DIAGNOSIS — Z11.59 NEED FOR HEPATITIS C SCREENING TEST: ICD-10-CM

## 2021-06-02 DIAGNOSIS — E66.811 CLASS 1 OBESITY WITH BODY MASS INDEX (BMI) OF 32.0 TO 32.9 IN ADULT, UNSPECIFIED OBESITY TYPE, UNSPECIFIED WHETHER SERIOUS COMORBIDITY PRESENT: ICD-10-CM

## 2021-06-02 DIAGNOSIS — Z01.419 ENCOUNTER FOR GYNECOLOGICAL EXAMINATION WITHOUT ABNORMAL FINDING: ICD-10-CM

## 2021-06-02 DIAGNOSIS — Z13.29 SCREENING FOR THYROID DISORDER: ICD-10-CM

## 2021-06-02 LAB
ALBUMIN SERPL-MCNC: 4.1 G/DL (ref 3.5–5)
ALP SERPL-CCNC: 74 U/L (ref 45–120)
ALT SERPL W P-5'-P-CCNC: 90 U/L (ref 0–45)
AST SERPL W P-5'-P-CCNC: 42 U/L (ref 0–40)
BASOPHILS # BLD AUTO: 0 THOU/UL (ref 0–0.2)
BASOPHILS NFR BLD AUTO: 0 % (ref 0–2)
BILIRUB DIRECT SERPL-MCNC: 0.3 MG/DL
BILIRUB SERPL-MCNC: 0.9 MG/DL (ref 0–1)
EOSINOPHIL # BLD AUTO: 0.3 THOU/UL (ref 0–0.4)
EOSINOPHIL NFR BLD AUTO: 4 % (ref 0–6)
ERYTHROCYTE [DISTWIDTH] IN BLOOD BY AUTOMATED COUNT: 12.2 % (ref 11–14.5)
HCG UR QL: NEGATIVE
HCT VFR BLD AUTO: 45.6 % (ref 35–47)
HGB BLD-MCNC: 14.9 G/DL (ref 12–16)
IMM GRANULOCYTES # BLD: 0 THOU/UL
IMM GRANULOCYTES NFR BLD: 0 %
LYMPHOCYTES # BLD AUTO: 2.7 THOU/UL (ref 0.8–4.4)
LYMPHOCYTES NFR BLD AUTO: 35 % (ref 20–40)
MCH RBC QN AUTO: 29.7 PG (ref 27–34)
MCHC RBC AUTO-ENTMCNC: 32.7 G/DL (ref 32–36)
MCV RBC AUTO: 91 FL (ref 80–100)
MONOCYTES # BLD AUTO: 0.6 THOU/UL (ref 0–0.9)
MONOCYTES NFR BLD AUTO: 8 % (ref 2–10)
NEUTROPHILS # BLD AUTO: 4.1 THOU/UL (ref 2–7.7)
NEUTROPHILS NFR BLD AUTO: 53 % (ref 50–70)
PLATELET # BLD AUTO: 232 THOU/UL (ref 140–440)
PMV BLD AUTO: 9.6 FL (ref 7–10)
PROLACTIN SERPL-MCNC: 10.9 NG/ML (ref 0–20)
PROT SERPL-MCNC: 7.1 G/DL (ref 6–8)
RBC # BLD AUTO: 5.02 MILL/UL (ref 3.8–5.4)
T4 FREE SERPL-MCNC: 1.1 NG/DL (ref 0.7–1.8)
TSH SERPL DL<=0.005 MIU/L-ACNC: 2.09 UIU/ML (ref 0.3–5)
WBC: 7.8 THOU/UL (ref 4–11)

## 2021-06-02 NOTE — TELEPHONE ENCOUNTER
----- Message from Andres Wells MD sent at 10/23/2019  1:48 PM CDT -----  Call:  Growth ultrasound looks good.

## 2021-06-02 NOTE — PROGRESS NOTES
36w2d  Ultrasound reviwed.  Estimated Fetal Weight: 2461 +/- 360 g  EFW Percentile: 47 percent      /62 (Patient Site: Right Arm, Patient Position: Sitting, Cuff Size: Adult Regular)   Pulse 100   Wt 184 lb (83.5 kg)   LMP 01/15/2019   BMI 33.12 kg/m      Recent Results (from the past 24 hour(s))   Urinalysis, OB Screen   Result Value Ref Range    Glucose, UA Negative Negative    Ketones, UA Negative Negative    Protein, UA Negative Negative mg/dL        1. Supervision of normal pregnancy in third trimester  - Urinalysis, OB Screen  - Group B Strep Screen by PCR

## 2021-06-02 NOTE — PATIENT INSTRUCTIONS - HE
Patient Education   HEALTHY PREGNANCY CARE: 34-36 WEEKS PREGNANT    Your baby is gaining about an ounce each day, so healthy nutrition and rest are still very important. Learn about late pregnancy symptoms, such as constipation and backaches. Drinking more fluids and eating more fiber can relieve constipation. The pelvic tilt and a heating pad can ease backaches.    Continue to watch for signs and symptoms of preeclampsia:     Sudden swelling of your face, hands, or feet     New vision problems such as blurring, double vision, or flashing lights    A severe headache not relieved with acetaminophen (Tylenol)    Sharp or stabbing pain in your right or middle upper abdomen    You're almost done with your pregnancy but it's still too soon to have your baby. The goal is to have your baby after 37 weeks, so watch for signs and symptoms of premature labor:     Regular contractions. This means having about 6 or more within 1 hour, even after you have had a glass of water and are resting.     A backache that starts and stops regularly.    An increase or change in vaginal discharge, such as heavy, mucus-like, watery, or bloody discharge.     Your water breaks or leaks.    You will be tested for group B streptococcus (GBS), a type of bacteria found in 10-30% of pregnant women. A woman with GBS can pass it to her baby during delivery. Most babies who get GBS from their mothers do not have any problems, but some babies get very ill and need to be hospitalized for antibiotic treatment. Treating you with antibiotics during labor and delivery helps to prevent infection in your baby.    Review information on postpartum care that you will need after the baby is born.  Discuss your choices and plans for birth control with your midwife or physician.     Postpartum Care  During the days and weeks after the delivery of your baby (postpartum period), your body will change as it returns to its nonpregnant condition. As with pregnancy  "changes, postpartum changes are different for every woman.    Physical changes after childbirth  The changes in your body may include:    Contractions called afterpains shrink the uterus for several days after childbirth. Shrinking of the uterus to its prepregnancy size may take 6 to 8 weeks.    Sore muscles (especially in the arms, neck, or jaw) are common after childbirth. This is because of the hard work of labor and pushing your baby out. The soreness should go away in a few days.    Bleeding and vaginal discharge (lochia) may last for 2 to 8 weeks, and can come and go for about 2 months.    Vaginal soreness, including pain, discomfort, and numbness, is common after vaginal birth. Soreness may be worse if you had a perineal tear or episiotomy.    If you had a  birth (), you may have pain in your lower abdomen and may need pain medicine for 1 to 2 weeks.    Breast engorgement is common around the 3rd or 4th day after delivery, when the breasts begin to fill with milk. This can cause discomfort and swelling. If you are breast feeding, the best treatment is to feed your baby often or pump the milk out. You can also use warm compresses. If you are not breast feeding, placing ice packs on your breasts, taking a hot shower, or using warm compresses may relieve the discomfort.    Be aware of postpartum depression    \"Baby blues\" are common for the first 1 to 2 weeks after birth. You may cry or feel sad or irritable for no reason.     For some women, these feelings last longer and are more intense. This is called postpartum depression.     If your symptoms last for more than a few weeks or you feel very depressed, ask your midwife or physician for help.     Postpartum depression can be treated. Support groups and counseling can help, but sometimes medication is needed.     Discuss follow-up appointments with your midwife or physician, as well. He or she will usually want to see you 6 weeks after the " birth of your baby, sooner if you are having problems.    If you have questions about any symptoms you are experiencing or any other concerns, call your provider or their clinic staff at [facility]/OB at Phone: [phone]. If it's after clinic hours, physician patients should call the [facility] at [phone] ([phone]); midwife patients should call their answering service at [phone].    How can you care for yourself at home?   You can refer to the Starting Out Right book or find it online at http://www.[facility].org/images/stories/http://www.[email or web address]/images/stories/maternity/[email or web address]-[email or web address]-Right.pdf or http://www.[facility].org/images/stories/flipbooks/[facility]-starting-out-right/[facility]-starting-out-right.html#p=8     You can sign up for a weekly parenting e-mail that gives support, tips and advice from health care professionals that starts with pregnancy and continues through the toddler years. To register, go to [email or web address] at any time during your pregnancy.    Making Early Breastfeeding or Chestfeeding Work: What's Important?  Breastfeeding/chestfeeding is important!     It helps keep babies healthy.    Parents who breastfeed/chestfeed have lower risks of breast and ovarian cancer.    It's convenient: the milk is always ready and warm, and there is nothing to mix or prepare for feeding.    Formula is harder for your baby to digest.    It helps you bond with your baby and protects against postpartum depression.  Lots of early skin-to-skin contact with your baby    Place your naked baby with baby's belly against your bare chest. Cover baby's back with a blanket    Start skin-to-skin right after birth, as soon as you are ready    Skin-to-skin:  ? Helps keep baby warm  ? Improves baby's oxygen and blood sugar levels  ? Helps your uterus contract and bleed less  ? Helps baby feel calm and comforted  ? Helps you feel close to baby  ? Helps get  "breastfeeding started. Being close makes latching on easier, and baby may move over to the nipple and latch without help  ? Baby breastfeeds better and longer when skin-to-skin  Placing baby well for good attachment to the breast or chest    Hold your baby close with baby's tummy touching your tummy.    Wait for baby to open mouth wide, then bring baby onto the breast/chest.    Baby should take a big mouthful of breast/chest, not just the nipple. This helps baby get more milk, and the suckling should feel comfortable.    When baby is latched well to the breast/chest, nipples aren't cracked or painful.  Keeping baby near (called \"rooming-in\" at the hospital)    Baby sleeps better and cries less when birth parent is near. Your room is quiet.    We will place your baby safely on their back in their bassinette. This lets you practice safe sleep for your baby while keeping them at your bedside.    Baby feeds more often, which means your milk supply increases faster, and your baby loses less weight.    Parents have an easier time getting to know and bonding with baby.    Parents feel much more confident about baby care and breastfeeding/chestfeeding.    Maternity staff can help at any time.  Feeding on cue    Feeding on cue simply means feeding whenever your baby shows signs of hunger    Crying is a late hunger sign. Feed baby whenever baby wants for as long as baby wants.    Feeding signs: mouth movements, sticking the tongue out, rooting (baby turns toward chest and may open mouth), hand-to-mouth movements    Advantages of feeding on cue:  ? Frequent breastfeeding/chestfeeding in the first weeks after birth gives you a good milk supply for months to come.  ? Babies settle into a relaxing feed more quickly. Babies enjoy feedings more when they don't have to cry to be fed.  ? Feeding is comfort as well as nutrition. Newborns love constant closeness and feeding and can't be held \"too much\" or \"spoiled.\"  ? Newborns need " "small frequent feedings in the first days of life. Just one to three teaspoons fill a new baby's stomach.  ? Responding to feeding cues helps babies gain weight.  Feeding only human milk in the first six months    Colostrum is the first milk that baby gets at birth. The amount of colostrum matches the baby's stomach, so it will not be overfilled.    The small volumes ready at birth are also easier for baby to handle.    All babies lose weight in the first few days. This is simply \"water weight.\"    Introducing food or fluids other than human milk too early can cause problems for breastfeeding/chestfeeding and for your baby's health.    Feeding only human milk maximizes the protection against disease and infections.    Your body knows how much milk to make by how often your baby feeds. If you give your baby formula, your body may not know how much milk to make.    For informational purposes only. Not to replace the advice of your health care provider. Adapted with permission from \"Getting Started with Infant Care and Breastfeeding,\" by FRANCISCA Briscoe, CATRACHITO, Joy Guzmán, RN, IBCLC, Chrissy Solis MD, CATRACHITO, and Ellen Williamson MD, IBCLC. [place] [employer], 2017. Clinically reviewed by [facility]. dakick [id number] - 05/19.        [facility] [place] Breastfeeding Resources       Research shows that human milk offers the best  nutrition and protection for babies. So at [facility],  we care for families and babies in a way that  promotes, teaches and supports lactation. We  support all breastfeeding, chestfeeding and human  milk feeding families, as well as families who can t  breastfeed / chestfeed or who choose not to do so.  A mother or caregiver s own milk is best for a baby,  but if you are unable to breastfeed / chestfeed, we  may suggest pasteurized donor human milk for your  baby while in the hospital.    Lactation support    The following [facility] [place]-affiliated locations offer  individual " outpatient lactation consults. Some  locations offer phone or group lactation consults  with certified lactation consultants. Call to confirm  services and for information and appointments. You  may wish to call your insurance company first to see  if they will cover the cost of a consult.    [facility]: [phone]    [facility]: [phone]    [facility]: [phone]  Offers [place] First Days program, which includes  group lactation visits and one free information session  about delivering your baby at a designated [facility] hospital and the importance and benefits  of breastfeeding and human milk. These group visits  also help patients with feeding concerns and offer  information about other postpartum topics.                For informational purposes only. Not to replace the advice of your health care  provider. Copyright   2005 [facility]. All rights reserved. [employer] 470195 - REV .   [facility] ([place]):  [phone]    [facility]):  [phone] or [phone]    [facility]):  [phone]    [facility]  ([place]): [phone]    [facility] ([place]):  [phone] or [phone]  Includes follow-up visits for caregivers of babies  discharged from the  intensive care unit  (NICU) at [facility].    [facility]):  [phone]    [facility] ([facility],  [facility], primary care clinics):  [phone]  Also offers weight checks, feeding discussions and support with a lactation consultant as a part of free, weekly support group.    [facility]: [phone]  Also offers a free weekly support group.                                                                                                                                                                                                       (continued)   You may also call [place] [facility] at [phone]  for information about [place] and [facility]  locations that offer lactation support. For information  about breastfeeding / chestfeeding and childbirth  classes in the [facility], go to [facility] at [email or web address]. For [facility], go to [email or web address] and click on  Classes and Events at the bottom of the page. Or, call  [phone].    Supplies    You can get breast pumps from the Birth[facility] nurses  at [facility] or [facility]  nurses at [facility]. Call your health  insurance to see if they will cover the cost of the  pump. Tell your nurse you d like a pump. They will  help you fill out the right paperwork. The pump will  be ready for you when you leave the hospital.    If you decide to get a pump after you leave the  hospital, you can get one from our partners at  [facility]. [facility] carries a range of  feeding supplies and pumps. Please call your health  insurance to see if they will cover the cost of the  pump. Then call [facility]  to find out what supplies and pumps are available.  Some stores may deliver the pump to your home.    [facility]:  [email or web address] Other lactation services    [facility] Ow[facility]: [phone] (24 hours a day)  [email or web address]  Offers support for breastfeeding / chestfeeding and  human milk feeding families. Call to find a group in  your area.    National Women [facility] Information [facility]:  [phone]  www.womenshealth.gov/breastfeeding  Offers a breastfeeding / chestfeeding information  line in English and Bangladeshi.    WIC (Women, Infants and Children) Program:  [phone]  Offers breastfeeding / chestfeeding counseling. Call  to find an office near  you.    Milk banking    [facility]  [email or web address]  [phone]  Consider freezing your extra collected milk to donate  to babies in need. Email or call for information.                           If you are deaf or hard of hearing, please let us know. We provide many free services including  sign language interpreters, oral interpreters, TTYs, telephone amplifiers, note takers and written materials.

## 2021-06-02 NOTE — TELEPHONE ENCOUNTER
Called and spoke with Holly Harrell , Message was given, Holly Harrell  understood, no further questions.  Use  line to contact :Saw ID: [id number]

## 2021-06-02 NOTE — PROGRESS NOTES
"35w1d   No ctx, lof, bleeding, or dc.  No HA or vision changes.    BP 92/64 (Patient Site: Right Arm, Patient Position: Sitting, Cuff Size: Adult Regular)   Pulse 92   Temp 97.2  F (36.2  C) (Oral)   Ht 5' 2.5\" (1.588 m)   Wt 180 lb 4 oz (81.8 kg)   LMP 01/15/2019   SpO2 97%   BMI 32.44 kg/m      Recent Results (from the past 48 hour(s))   Urinalysis   Result Value Ref Range    Color, UA Yellow Colorless, Yellow, Straw, Light Yellow    Clarity, UA Clear Clear    Glucose, UA Negative Negative    Bilirubin, UA Negative Negative    Ketones, UA Negative Negative    Specific Gravity, UA 1.020 1.005 - 1.030    Blood, UA Negative Negative    pH, UA 7.0 5.0 - 8.0    Protein, UA Negative Negative mg/dL    Urobilinogen, UA 0.2 E.U./dL 0.2 E.U./dL, 1.0 E.U./dL    Nitrite, UA Negative Negative    Leukocytes, UA Moderate (!) Negative    Bacteria, UA Few (!) None Seen hpf    RBC, UA 0-2 None Seen, 0-2 hpf    WBC, UA 5-10 (!) None Seen, 0-5 hpf    Squam Epithel, UA 5-10 (!) None Seen, 0-5 lpf      1. Third trimester pregnancy  Measures a bit large to will check us.  "

## 2021-06-02 NOTE — PATIENT INSTRUCTIONS - HE
Patient Education   HEALTHY PREGNANCY CARE: 34-36 WEEKS PREGNANT    Your baby is gaining about an ounce each day, so healthy nutrition and rest are still very important. Learn about late pregnancy symptoms, such as constipation and backaches. Drinking more fluids and eating more fiber can relieve constipation. The pelvic tilt and a heating pad can ease backaches.    Continue to watch for signs and symptoms of preeclampsia:     Sudden swelling of your face, hands, or feet     New vision problems such as blurring, double vision, or flashing lights    A severe headache not relieved with acetaminophen (Tylenol)    Sharp or stabbing pain in your right or middle upper abdomen    You're almost done with your pregnancy but it's still too soon to have your baby. The goal is to have your baby after 37 weeks, so watch for signs and symptoms of premature labor:     Regular contractions. This means having about 6 or more within 1 hour, even after you have had a glass of water and are resting.     A backache that starts and stops regularly.    An increase or change in vaginal discharge, such as heavy, mucus-like, watery, or bloody discharge.     Your water breaks or leaks.    You will be tested for group B streptococcus (GBS), a type of bacteria found in 10-30% of pregnant women. A woman with GBS can pass it to her baby during delivery. Most babies who get GBS from their mothers do not have any problems, but some babies get very ill and need to be hospitalized for antibiotic treatment. Treating you with antibiotics during labor and delivery helps to prevent infection in your baby.    Review information on postpartum care that you will need after the baby is born.  Discuss your choices and plans for birth control with your midwife or physician.     Postpartum Care  During the days and weeks after the delivery of your baby (postpartum period), your body will change as it returns to its nonpregnant condition. As with pregnancy  "changes, postpartum changes are different for every woman.    Physical changes after childbirth  The changes in your body may include:    Contractions called afterpains shrink the uterus for several days after childbirth. Shrinking of the uterus to its prepregnancy size may take 6 to 8 weeks.    Sore muscles (especially in the arms, neck, or jaw) are common after childbirth. This is because of the hard work of labor and pushing your baby out. The soreness should go away in a few days.    Bleeding and vaginal discharge (lochia) may last for 2 to 8 weeks, and can come and go for about 2 months.    Vaginal soreness, including pain, discomfort, and numbness, is common after vaginal birth. Soreness may be worse if you had a perineal tear or episiotomy.    If you had a  birth (), you may have pain in your lower abdomen and may need pain medicine for 1 to 2 weeks.    Breast engorgement is common around the 3rd or 4th day after delivery, when the breasts begin to fill with milk. This can cause discomfort and swelling. If you are breast feeding, the best treatment is to feed your baby often or pump the milk out. You can also use warm compresses. If you are not breast feeding, placing ice packs on your breasts, taking a hot shower, or using warm compresses may relieve the discomfort.    Be aware of postpartum depression    \"Baby blues\" are common for the first 1 to 2 weeks after birth. You may cry or feel sad or irritable for no reason.     For some women, these feelings last longer and are more intense. This is called postpartum depression.     If your symptoms last for more than a few weeks or you feel very depressed, ask your midwife or physician for help.     Postpartum depression can be treated. Support groups and counseling can help, but sometimes medication is needed.     Discuss follow-up appointments with your midwife or physician, as well. He or she will usually want to see you 6 weeks after the " birth of your baby, sooner if you are having problems.    If you have questions about any symptoms you are experiencing or any other concerns, call your provider or their clinic staff at [facility]/OB at Phone: [phone]. If it's after clinic hours, physician patients should call the [facility] Connection at [phone] ([phone]); midwife patients should call their answering service at [phone].    How can you care for yourself at home?   You can refer to the Starting Out Right book or find it online at [email or web address]/images/stories/http://www.[email or web address]/images/stories/[email or web address]-[email or web address]-Right.pdf or http://www.[facility].org/images/stories/flipbooks/[facility]-starting-out-right/[facility]-starting-out-right.html#p=8     You can sign up for a weekly parenting e-mail that gives support, tips and advice from health care professionals that starts with pregnancy and continues through the toddler years. To register, go to [email or web address] at any time during your pregnancy.    Making Early Breastfeeding or Chestfeeding Work: What's Important?  Breastfeeding/chestfeeding is important!     It helps keep babies healthy.    Parents who breastfeed/chestfeed have lower risks of breast and ovarian cancer.    It's convenient: the milk is always ready and warm, and there is nothing to mix or prepare for feeding.    Formula is harder for your baby to digest.    It helps you bond with your baby and protects against postpartum depression.  Lots of early skin-to-skin contact with your baby    Place your naked baby with baby's belly against your bare chest. Cover baby's back with a blanket    Start skin-to-skin right after birth, as soon as you are ready    Skin-to-skin:  ? Helps keep baby warm  ? Improves baby's oxygen and blood sugar levels  ? Helps your uterus contract and bleed less  ? Helps baby feel calm and comforted  ? Helps you feel close to baby  ? Helps get  "breastfeeding started. Being close makes latching on easier, and baby may move over to the nipple and latch without help  ? Baby breastfeeds better and longer when skin-to-skin  Placing baby well for good attachment to the breast or chest    Hold your baby close with baby's tummy touching your tummy.    Wait for baby to open mouth wide, then bring baby onto the breast/chest.    Baby should take a big mouthful of breast/chest, not just the nipple. This helps baby get more milk, and the suckling should feel comfortable.    When baby is latched well to the breast/chest, nipples aren't cracked or painful.  Keeping baby near (called \"rooming-in\" at the hospital)    Baby sleeps better and cries less when birth parent is near. Your room is quiet.    We will place your baby safely on their back in their bassinette. This lets you practice safe sleep for your baby while keeping them at your bedside.    Baby feeds more often, which means your milk supply increases faster, and your baby loses less weight.    Parents have an easier time getting to know and bonding with baby.    Parents feel much more confident about baby care and breastfeeding/chestfeeding.    Maternity staff can help at any time.  Feeding on cue    Feeding on cue simply means feeding whenever your baby shows signs of hunger    Crying is a late hunger sign. Feed baby whenever baby wants for as long as baby wants.    Feeding signs: mouth movements, sticking the tongue out, rooting (baby turns toward chest and may open mouth), hand-to-mouth movements    Advantages of feeding on cue:  ? Frequent breastfeeding/chestfeeding in the first weeks after birth gives you a good milk supply for months to come.  ? Babies settle into a relaxing feed more quickly. Babies enjoy feedings more when they don't have to cry to be fed.  ? Feeding is comfort as well as nutrition. Newborns love constant closeness and feeding and can't be held \"too much\" or \"spoiled.\"  ? Newborns need " "small frequent feedings in the first days of life. Just one to three teaspoons fill a new baby's stomach.  ? Responding to feeding cues helps babies gain weight.  Feeding only human milk in the first six months    Colostrum is the first milk that baby gets at birth. The amount of colostrum matches the baby's stomach, so it will not be overfilled.    The small volumes ready at birth are also easier for baby to handle.    All babies lose weight in the first few days. This is simply \"water weight.\"    Introducing food or fluids other than human milk too early can cause problems for breastfeeding/chestfeeding and for your baby's health.    Feeding only human milk maximizes the protection against disease and infections.    Your body knows how much milk to make by how often your baby feeds. If you give your baby formula, your body may not know how much milk to make.    For informational purposes only. Not to replace the advice of your health care provider. Adapted with permission from \"Getting Started with Infant Care and Breastfeeding,\" by RFANCISCA Briscoe, CATRACHITO, Joy Guzmán, RN, IBCLC, Chrissy Solis MD, CATRACHITO, and Ellen Williamson MD, IBCLC. [place] [employer], 2017. Clinically reviewed by [facility]. [employer] 604788 - 05/19.        [facility] Breastfeeding Resources       Research shows that human milk offers the best  nutrition and protection for babies. So at [facility],  we care for families and babies in a way that  promotes, teaches and supports lactation. We  support all breastfeeding, chestfeeding and human  milk feeding families, as well as families who can t  breastfeed / chestfeed or who choose not to do so.  A mother or caregiver s own milk is best for a baby,  but if you are unable to breastfeed / chestfeed, we  may suggest pasteurized donor human milk for your  baby while in the hospital.    Lactation support    The following [facility]-affiliated locations offer  individual " outpatient lactation consults. Some  locations offer phone or group lactation consults  with certified lactation consultants. Call to confirm  services and for information and appointments. You  may wish to call your insurance company first to see  if they will cover the cost of a consult.    [facility]: [phone]    [facility]: [phone]    [facility]: [phone]  Offers [place] First Days program, which includes  group lactation visits and one free information session  about delivering your baby at a designated [facility] hospital and the importance and benefits  of breastfeeding and human milk. These group visits  also help patients with feeding concerns and offer  information about other postpartum topics.                For informational purposes only. Not to replace the advice of your health care  provider. Copyright   2005 [facility]. All rights reserved. [employer] 932430 - REV .   [facility] ([place]):  [phone]    [facility]):  [phone] or [phone]    [facility]):  [phone]    [facility]  ([place]): [phone]    [facility] ([place]):  [phone] or [phone]  Includes follow-up visits for caregivers of babies  discharged from the  intensive care unit  (NICU) at [facility].    [facility]):  [phone]    [facility] ([facility],  [facility], primary care clinics):  [phone]  Also offers weight checks, feeding discussions and support with a lactation consultant as a part of free, weekly support group.    [facility]: [phone]  Also offers a free weekly support group.                                                                                                                                                                                                       (continued)   You may also call [facility] at [phone]  for information about [facility] and [facility]  locations that offer lactation support. For information  about breastfeeding / chestfeeding and childbirth  classes in the [facility], go to [facility] at www.[employer]. For [facility] [place], go to www.PhoneAndPhone.org and click on  Classes and Events at the bottom of the page. Or, call  [phone].    Supplies    You can get breast pumps from the Birthplace nurses  at [facility] or [facility]  nurses at [facility]. Call your health  insurance to see if they will cover the cost of the  pump. Tell your nurse you d like a pump. They will  help you fill out the right paperwork. The pump will  be ready for you when you leave the hospital.    If you decide to get a pump after you leave the  hospital, you can get one from our partners at  [facility]. [facility] carries a range of  feeding supplies and pumps. Please call your health  insurance to see if they will cover the cost of the  pump. Then call [facility]  to find out what supplies and pumps are available.  Some stores may deliver the pump to your home.    [facility]:  [email or web address] Other lactation services    [facility]: [phone] (24 hours a day)  www.[email or web address]  Offers support for breastfeeding / chestfeeding and  human milk feeding families. Call to find a group in  your area.    National [facility] Information [facility]:  [phone]  www.womenshealth.gov/breastfeeding  Offers a breastfeeding / chestfeeding information  line in English and [place].    WIC (Women, Infants and Children) Program:  [phone]  Offers breastfeeding / chestfeeding counseling. Call  to find an office near  you.    Milk banking    [facility]  [email or web address]  [phone]  Consider freezing your extra collected milk to donate  to babies in need. Email or call for information.                           If you are deaf or hard of hearing, please let us know. We provide many free services including  sign language interpreters, oral interpreters, TTYs, telephone amplifiers, note takers and written materials.

## 2021-06-03 VITALS — WEIGHT: 158 LBS | HEIGHT: 63 IN | BODY MASS INDEX: 28 KG/M2

## 2021-06-03 VITALS
SYSTOLIC BLOOD PRESSURE: 100 MMHG | DIASTOLIC BLOOD PRESSURE: 62 MMHG | BODY MASS INDEX: 33.12 KG/M2 | HEART RATE: 100 BPM | WEIGHT: 184 LBS

## 2021-06-03 VITALS
WEIGHT: 180.25 LBS | OXYGEN SATURATION: 97 % | SYSTOLIC BLOOD PRESSURE: 92 MMHG | BODY MASS INDEX: 31.94 KG/M2 | HEIGHT: 63 IN | TEMPERATURE: 97.2 F | HEART RATE: 92 BPM | DIASTOLIC BLOOD PRESSURE: 64 MMHG

## 2021-06-03 VITALS — WEIGHT: 173 LBS | BODY MASS INDEX: 31.14 KG/M2

## 2021-06-03 VITALS
SYSTOLIC BLOOD PRESSURE: 100 MMHG | HEART RATE: 96 BPM | DIASTOLIC BLOOD PRESSURE: 58 MMHG | HEIGHT: 63 IN | BODY MASS INDEX: 31.18 KG/M2 | RESPIRATION RATE: 20 BRPM | WEIGHT: 176 LBS

## 2021-06-03 VITALS
DIASTOLIC BLOOD PRESSURE: 70 MMHG | SYSTOLIC BLOOD PRESSURE: 108 MMHG | WEIGHT: 189 LBS | BODY MASS INDEX: 34.02 KG/M2 | HEART RATE: 96 BPM

## 2021-06-03 VITALS — BODY MASS INDEX: 29.7 KG/M2 | WEIGHT: 165 LBS

## 2021-06-03 VITALS — BODY MASS INDEX: 27.9 KG/M2 | WEIGHT: 155 LBS

## 2021-06-03 LAB
C TRACH DNA SPEC QL NAA+PROBE: NEGATIVE
DHEA-S SERPL-MCNC: 199 UG/DL (ref 65–380)
HCV AB SERPL QL IA: NEGATIVE
N GONORRHOEA DNA SPEC QL NAA+PROBE: NEGATIVE
SPEC DESCRIPTION: NORMAL
SPECIMEN DESCRIPTION: NORMAL

## 2021-06-03 NOTE — PROGRESS NOTES
37w3d   No ctx, lof, bleeding, or dc.  No HA or vision changes.    /70 (Patient Site: Left Arm, Patient Position: Sitting, Cuff Size: Adult Regular)   Pulse 96   Wt 189 lb (85.7 kg)   LMP 01/15/2019   BMI 34.02 kg/m      Recent Results (from the past 240 hour(s))   Urinalysis, OB Screen   Result Value Ref Range    Glucose, UA Negative Negative    Ketones, UA Negative Negative    Protein, UA Negative Negative mg/dL   Syphilis Screen, [place]   Result Value Ref Range    Treponema Antibody (Syphilis) Negative Negative      1. Supervision of normal pregnancy in third trimester  - Urinalysis, OB Screen    2. Constipation, unspecified constipation type  - polyethylene glycol (GLYCOLAX) 17 gram/dose powder; Mix half capful in 4 oz juice and drink one time per day as needed for constipation.  Dispense: 527 g; Refill: 1

## 2021-06-04 VITALS
WEIGHT: 171 LBS | DIASTOLIC BLOOD PRESSURE: 60 MMHG | HEART RATE: 92 BPM | BODY MASS INDEX: 30.78 KG/M2 | SYSTOLIC BLOOD PRESSURE: 100 MMHG

## 2021-06-04 LAB
BKR LAB AP ABNORMAL BLEEDING: NO
BKR LAB AP BIRTH CONTROL/HORMONES: NORMAL
BKR LAB AP CERVICAL APPEARANCE: NORMAL
BKR LAB AP GYN ADEQUACY: NORMAL
BKR LAB AP GYN INTERPRETATION: NORMAL
BKR LAB AP HPV REFLEX: NORMAL
BKR LAB AP LMP: NORMAL
BKR LAB AP PATIENT STATUS: NORMAL
BKR LAB AP PREVIOUS ABNORMAL: NORMAL
BKR LAB AP PREVIOUS NORMAL: NORMAL
HIGH RISK?: NO
PATH REPORT.COMMENTS IMP SPEC: NORMAL
RESULT FLAG (HE HISTORICAL CONVERSION): NORMAL
SHBG SERPL-SCNC: 27 NMOL/L (ref 30–135)
TESTOST FREE SERPL-MCNC: 0.99 NG/DL (ref 0.08–0.74)
TESTOST SERPL-MCNC: 49 NG/DL (ref 8–60)

## 2021-06-05 NOTE — PROGRESS NOTES
Post Partum Check:    Delivery at 37w4d now .  Date of delivery: 19    Patient concerns: none    Bleeding: no concerns  LMP as noted  No long breast feeding.    Pain: no concerns    LMP:Patient's last menstrual period was 2010.     Depression: no concerns  EPDS Score: 0    Contraception Plan:  Does not want.  Discussed that fertility may have already returned.    Immunizations needed:  none    Pap smear:  Not indicated    Assessment:  Well post partum check up.    Plan:  RTC for contraception.  Options discussed, but she did not want any now.  Discussed return of fertility.  PNVs discussed if at risk for pregnancy.

## 2021-06-08 ENCOUNTER — COMMUNICATION - HEALTHEAST (OUTPATIENT)
Dept: FAMILY MEDICINE | Facility: CLINIC | Age: 21
End: 2021-06-08

## 2021-06-11 ENCOUNTER — COMMUNICATION - HEALTHEAST (OUTPATIENT)
Dept: SCHEDULING | Facility: CLINIC | Age: 21
End: 2021-06-11

## 2021-06-15 ENCOUNTER — OFFICE VISIT - HEALTHEAST (OUTPATIENT)
Dept: FAMILY MEDICINE | Facility: CLINIC | Age: 21
End: 2021-06-15

## 2021-06-15 DIAGNOSIS — R30.0 DYSURIA: ICD-10-CM

## 2021-06-15 DIAGNOSIS — N93.9 ABNORMAL UTERINE BLEEDING (AUB): ICD-10-CM

## 2021-06-15 DIAGNOSIS — R93.89 ENDOMETRIAL THICKENING ON ULTRASOUND: ICD-10-CM

## 2021-06-15 LAB
ALBUMIN UR-MCNC: NEGATIVE G/DL
APPEARANCE UR: CLEAR
BACTERIA #/AREA URNS HPF: ABNORMAL /[HPF]
BILIRUB UR QL STRIP: NEGATIVE
COLOR UR AUTO: YELLOW
GLUCOSE UR STRIP-MCNC: NEGATIVE MG/DL
HCG UR QL: NEGATIVE
HGB UR QL STRIP: ABNORMAL
KETONES UR STRIP-MCNC: NEGATIVE MG/DL
LEUKOCYTE ESTERASE UR QL STRIP: ABNORMAL
NITRATE UR QL: NEGATIVE
PH UR STRIP: 6.5 [PH] (ref 5–8)
RBC #/AREA URNS AUTO: ABNORMAL HPF
SP GR UR STRIP: 1.02 (ref 1–1.03)
SQUAMOUS #/AREA URNS AUTO: ABNORMAL LPF
UROBILINOGEN UR STRIP-ACNC: ABNORMAL
WBC #/AREA URNS AUTO: ABNORMAL HPF

## 2021-06-15 RX ORDER — NITROFURANTOIN 25; 75 MG/1; MG/1
100 CAPSULE ORAL 2 TIMES DAILY
Qty: 14 CAPSULE | Refills: 0 | Status: SHIPPED | OUTPATIENT
Start: 2021-06-15 | End: 2021-06-22

## 2021-06-15 NOTE — PROGRESS NOTES
"Subjective: This patient comes in for follow-up is an 17-year-old female.  She was seen back in 2014 had evidence of diffuse arthralgia have lab work with negative found a negative rheumatoid and normal sed rate.    She comes in today with ongoing symptoms.  It is worse in her legs and knees also has it in her elbows at time she states.    Does not get any swelling but has persisting pain.  Not really related to activity he can just come on on its own.    Denies any fever chills    Does not have any weakness.    Also needed immunization update with Td hepatitis A and Menactra.    Patient denies any swelling denies any redness in any of the joints denies any skin changes or oral mucosal changes.    No shortness of breath or chest discomfort no pleuritic pain    Tobacco status: She  reports that she has never smoked. She has never used smokeless tobacco.    Patient Active Problem List    Diagnosis Date Noted     Hearing Loss      Abdominal Pain In The Central Upper Belly (Epigastric)      Arthralgia        Current Outpatient Prescriptions   Medication Sig Dispense Refill     naproxen (NAPROSYN) 375 MG tablet Take 1 tablet (375 mg total) by mouth 2 (two) times a day with meals. 40 tablet 1     No current facility-administered medications for this visit.        ROS:   10 point review of systems negative other than as outlined above    Objective:    /80 (Patient Site: Left Arm, Patient Position: Sitting, Cuff Size: Adult Regular)  Pulse 88  Resp 16  Ht 5' 2.5\" (1.588 m)  Wt 133 lb (60.3 kg)  LMP 01/06/2018  BMI 23.94 kg/m2  Body mass index is 23.94 kg/(m^2).      General appearance no acute distress    Vital signs were stable    Her lungs sounded clear heart was regular and did not hear any murmur    Abdomen is nontender.  No masses    Skin was normal no rashes.    Extremities without edema    Joints without redness warmth or swelling.  She complains of discomfort in through her knees more on the left than " the right today also at elbows but there was no instability swelling.    No synovial thickening hands were normal.    Previous labs reviewed within normal phantom rheumatoid factor and sed rate          Assessment:  1. Arthralgia  naproxen (NAPROSYN) 375 MG tablet    Ambulatory referral to Rheumatology   2. Immunization counseling  Td, Adult, Adsorbed (blue label)    Hepatitis A vaccine Ped/Adol 2 dose IM (18yr & under)    Meningococcal MCV4P     Diffuse arthralgias symptoms/leg pains.  Will treat with naproxen 375 mg twice daily.    Referral to rheumatology    TD hepatitis A and Menactra immunization update.    Plan: As outlined above    This transcription uses voice recognition software, which may contain typographical errors.

## 2021-06-16 ENCOUNTER — HOSPITAL ENCOUNTER (OUTPATIENT)
Dept: ULTRASOUND IMAGING | Facility: CLINIC | Age: 21
Discharge: HOME | End: 2021-06-16
Attending: FAMILY MEDICINE

## 2021-06-16 DIAGNOSIS — N93.9 ABNORMAL UTERINE BLEEDING (AUB): ICD-10-CM

## 2021-06-16 PROBLEM — Z34.00 SUPERVISION OF NORMAL FIRST PREGNANCY, ANTEPARTUM: Status: ACTIVE | Noted: 2019-06-13

## 2021-06-16 NOTE — PROGRESS NOTES
ASSESSMENT AND PLAN:  Holly Harrell 18 y.o. female is seen here on 02/27/18 for evaluation of painful lower extremities.  She has had this going on for the past several years.  She very likely does not have arthralgias, she certainly does not have evidence of inflammatory arthropathy in any of the palpable appendicular joints, there is little to suggest connective tissue disease.  She does not have enthesitis or dactylitis.  There are no features to suggest fibromyalgia.  Have discussed this with her via the .  Reassured her.  We will observe further.  Start her on diclofenac.  Major side effects outlined.    .Diagnoses and all orders for this visit:    Pain in both lower extremities  -     diclofenac (VOLTAREN) 75 MG EC tablet; Take 1 tablet (75 mg total) by mouth 2 (two) times a day.  Dispense: 60 tablet; Refill: 1      HISTORY OF PRESENTING ILLNESS:  Holly Harrell, 18 y.o., female is here for evaluation of pain.  This is gone on for the past 8 possibly 10 years.  She recalls that this was at the time day when she was a child.  This is via the .  She reports that the pain comes and goes.  This seems to be randomness to it.  This is typically below the knees on the shin on the calf areas.  Sometimes it does go further cranially towards the thigh area.  Occasionally she has had pain in her upper extremity radiating down her wrist area.  There is no pain in the small joints of the hands or wrists themselves elbows knees ankles or the toes.  She reports no neck or back pain.  There is no history of trauma.  She has not observed any swelling of the joints including the small ones in the hands of the feet.  She reports no history of an intake of medication that was prescribed or over-the-counter.  She has not even taken Tylenol for this.  She reports no mouth ulcers, photosensitivity, pleuritic symptoms.  She has not had a seizure disorder.  She gets a good night, restorative sleep.  There is no features  "suggestive of irritable bladder or bowel syndrome. She describes herself otherwise in good health.  She is going to high school.  Further historical information and ADL limitations as noted in the multidimensional health assessment questionnaire attached in the EMR. Rest of the 13 system ROS is negative.     ALLERGIES:Review of patient's allergies indicates no known allergies.    PAST MEDICAL/ACTIVE PROBLEMS/MEDICATION/ FAMILY HISTORY/SOCIAL DATA:  The patient has a family history of  No past medical history on file.  History   Smoking Status     Never Smoker   Smokeless Tobacco     Never Used     Patient Active Problem List   Diagnosis     Hearing Loss     Abdominal Pain In The Central Upper Belly (Epigastric)     Arthralgia     Pain in both lower extremities     Current Outpatient Prescriptions   Medication Sig Dispense Refill     diclofenac (VOLTAREN) 75 MG EC tablet Take 1 tablet (75 mg total) by mouth 2 (two) times a day. 60 tablet 1     No current facility-administered medications for this visit.        COMPREHENSIVE EXAMINATION:  Vitals:    02/27/18 0814   BP: 98/64   Pulse: 63   Weight: 128 lb (58.1 kg)   Height: 5' 2.5\" (1.588 m)     A well appearing alert oriented female. Vital data as noted above. Her eyes without inflammation/scleromalacia. ENTwithout oral mucositis, thrush, nasal deformity, external ear redness, deformity. Her neck is without lymphadenopathy and supple. Lungs normal sounds, no pleural rub. Heart auscultation normal rate, rhythm; no pericardial rub and murmurs. Abdomen soft, non tender, no organomegaly. Skin examined for heliotrope, malar area eruption, lupus pernio, periungual erythema, sclerodactyly, papules, erythema nodosum, purpura, nail pitting, onycholysis, and obvious psoriasis lesion. Neurological examination shows normal alertness, speech, facial symmetry, tone and power in upper and lower extremities, Tinel's and Phalen's at wrist and gait. The joint examination is performed " for swelling, tenderness, warmth, erythema, and range of motion in the following joints: DIPs, PIPs, MCPs, wrists, first CMC's, elbows, shoulders, hips, knees, ankles, feet; spine for range of motion and paraspinal muscles for tenderness. The salient normal / abnormal findings are appended.  She is examined in the nurse's presence.  There is no tenderness or swelling in any of the palpable appendicular joints.  She has full range of motion of the hip joints and the shoulder joints.  She does not have tenderness across the trochanter along the paraspinal region.  She does not have a rash on the lower extremities such as erythema nodosum.  There is no sclerodactyly.  There is no periungual erythema.  There is no moderate area eruption.  She is not tender across the trapezius paraspinal regions.    LAB / IMAGING DATA:  No results found for: ALT, ALBUMIN, CREATININE    No results found for: WBC, HGB, PLT    Lab Results   Component Value Date    RF <15.0 07/23/2014    SEDRATE 17 07/23/2014

## 2021-06-17 NOTE — PATIENT INSTRUCTIONS - HE
Patient Instructions by Andres Wells MD at 9/24/2019  8:00 AM     Author: Andres Wells MD Service: -- Author Type: Physician    Filed: 9/24/2019  8:28 AM Encounter Date: 9/24/2019 Status: Signed    : Andres Wells MD (Physician)         Patient Education   HEALTHY PREGNANCY CARE: 26-30 WEEKS PREGNANT    You are now in your last trimester of pregnancy. Your baby is growing rapidly, can open and close her eyelids, sometimes get hiccups, and you'll probably feel her moving around more often. Your baby has breathing movements and is gaining about one ounce each day. You may notice heartburn and leg cramps. Your back may ache as your body gets used to your baby's size and length.    Discuss your work situation with your midwife or physician as needed. If you stand for long periods of time, you may need to make changes and take breaks.    Pre-register online at the hospital where your baby will be born ([email or web address])     Be aware of your baby's activity level. You may be asked to do daily fetal movement counts. Contact your midwife or physician about any decreased movement.    You may have been tested for gestational diabetes today. If you are RH negative, you may have had an additional test and a Rhogam injection.    Consider receiving a Tdap vaccination to protect your baby from Pertussis/whooping cough.    If you are considering tubal ligation, discuss this with your midwife or physician. You will need to have an appointment with the obstetrician who will do the surgery to discuss the risks, benefits, and alternatives, and to sign the consent. This can be discussed at your next visit.    Continue to watch for any signs or symptoms of premature labor:     Regular contractions. This means having about 6 or more within 1 hour, even after you have had a glass of water and are resting.     A backache that starts and stops regularly.    An increase  "or change in vaginal discharge, such as heavy, mucus-like, watery, or bloody discharge.     Your water breaks or leaks.    Continue to watch for signs and symptoms of preeclampsia:     Sudden swelling of your face, hands, or feet     New vision problems such as blurring, double vision, or flashing lights    A severe headache not relieved with acetaminophen (Tylenol)    Sharp or stabbing pain in your right or middle upper abdomen    If you have any of the above symptoms or any other concerns, call your midwife or physician or their clinic staff at [facility]/OB at Phone: [phone]. If it's after clinic hours, physician patients should call the [facility] at [phone] ([phone]); midwife patients should call their answering service at [phone].    How can you care for yourself at home?   You can refer to the Starting Out Right book or find it online at [email or web address] or [email or web address]#p=8     You can sign up for a weekly parenting e-mail that gives support, tips and advice from health care professionals that starts with pregnancy and continues through the toddler years. To register, go to [email or web address] at any time during your pregnancy.    BREASTFEEDING TIPS FOR NEW MOMS     Importance of skin-to-skin contact:  ? Gets breastfeeding off to a good start  ? Keeps baby warm and is great for bonding  ? Provides calming effects and helps to stabilize breathing and  blood sugar  ? Helps the uterus to contract and bleed less    Importance of feeding whenever baby shows signs of  being hungry, \"feeding on cue\":  ? Helps create a good milk supply appropriate to the babys needs  ? Less breastfeeding complications such as engorgement or  low supply  ? Helps baby get enough milk  ? Milk supply is determined by how " often baby nurses and empties  the breast.  ? Feeding is for comfort as well as nutrition    Importance of good latch (positioning and attaching  baby properly at breast):  ? Helps prevent sore nipples  ? Helps ensure baby gets enough milk and supports moms breast  milk supply    Risks of giving baby supplements other  than moms breastmilk  Breastfeeding alone is recommended for the first 6 months, if not it:  ? Can make baby more prone to illness  ? Can make baby less satisfied at breast (wanting larger amounts or  faster flow)  ? Reduces milk supply    Importance of rooming-in:  ? Increases parent confidence while mother is supported by the  hospital staff  ? Caregivers learn how to care for baby and recognize feeding cues  ? Enables feeding whenever baby needs to eat  ? Baby is comforted with mom and learns to recognize caregivers    Avoiding use of pacifiers:  ? Use of pacifiers in the first weeks can make it difficult to make a full  milk supply  ? May interfere with baby learning to latch well      2017 tg [id number]. SW [id number]. DOD 11.17           Breastfeeding   Why Its Important and What to Expect     Your breast milk is the best food for your baby--and  breastfeeding can help you be healthy as well.    Though breastfeeding is natural, it is a learned process for both mother and baby. To prepare, there are things you can learn--and do--before your baby is born.    ? Learn the benefits of breastfeeding.    ? Understand the basic process.    ? Know what to expect in the hospital.    ? Arrange breastfeeding support for the first few  weeks after birth.    ? Take a breastfeeding class (see back page).    ? Talk to your midwife, nurse or doctor if you have  Questions.    The benefits of breastfeeding    Human milk changes to meet the needs of a growing baby. It is all a baby needs for the first six months of life.    In fact, babies who receive only human milk for the  first six months are less likely to develop  colds, the  flu, colic, asthma, ear infections, food allergies and  diarrhea (loose, watery stools). They may be less likely      to be overweight as children, and they are less likely to develop diabetes later in life. Some studies also show that infants have a higher IQ if they are .    Breastfeeding can:    ? Help you and your baby develop a special bond--  and make you feel proud that you can feed your  Baby.    ? Reduce the total amount of blood you will lose  after delivery.    ? Help your uterus return to its non-pregnant size.    ? Reduce the risk of Sudden Infant Death Syndrome (SIDS).    ? Help you lose your pregnancy weight more quickly.    ? Help delay the return of your monthly periods.    ? Lower your risk of some breast and ovarian  cancers--as well as osteoporosis (bone loss)--later in life.    ? Save you more than $300 per month. (This  includes the cost of formula and medical bills.  Formula-fed babies get sick more often.)          If you are deaf or hard of hearing, please let us know. We provide many free services including  sign language interpreters, oral interpreters, TTYs, telephone amplifiers, note takers and written materials.        How to breastfeed    Skin-to-skin contact    Hold your baby on your chest skin-to-skin right after  birth. Skin contact calms your baby, steadies their  breathing and keeps your baby warm. Your baby will be alert and will likely want to feed within the first hour after birth.    Babies are born with reflexes that help them  breastfeed. Your body will be ready with early milk  (called colostrum), so you will have all the milk your  baby needs for that first feeding. Your nurse will help you get started.    Keep your baby with you and breastfeed whenever  your baby is hungry. Offering the breast early and  often helps your body keep making lots of milk.    How to position your baby    There are many positions for breastfeeding.              No matter  which position you choose, support your  babys back, shoulders and neck. The head and body should be in a straight line, and the entire body should face the breast. Your baby should be able to tilt the head back easily. Your baby shouldnt have to reach out to feed. Also make sure your babys nose is level with your nipple. This way, your baby will find it easier to attach to your breast.    Finally, get comfortable. Use pillows to support your  body. Dont lean over or slump to reach your baby.  Once your baby is attached to the breast, its okay to change your position slightly.    You will feel a bit of a tugging at first, but you should  never feel pain. If you do, ask your nurse or lactation expert for help. She will teach you how to latch your baby onto the breast in a way that feels more comfortable.    Breastfeeding in the hospital    For the first three days after birth, your body will  produce early milk called colostrum. This milk is  full of calories and antibodies to help keep your baby healthy. It is all your baby needs for the first few days.    Remember, babies do not eat anything while inside  you. Right after birth, your baby will only need a little bit of milk (about 1 teaspoon per feeding) to get the digestive system working well. Your baby will not need any formula--your body will make the right amount of milk.    Breastfeed whenever your baby shows signs of hunger. (See next page for a list of signs.) Crying is a late sign of hunger.    Babies often lose weight in the days after birth. This  is normal. By two weeks of age, your baby should be back to their birth weight. Your care team will watch your babys weight carefully.    If you are unable to breastfeed in the hospital, your  care team may suggest pasteurized donor human milk for your baby.             The Most Important Points to Remember    ? Your breast milk is the perfect food for your  baby. Breastfeeding has a lot of health benefits  for  you as well.    ? Hold your baby skin-to-skin as soon as possible  after birth. Do this for as long as you can. Even  if your baby doesnt go to the breast right away,  skin-to-skin contact helps your body make  more milk. This lets you get an early start on  Breastfeeding.    ? Learn how to position your baby at the breast.  This will help your baby feed well, and it will  keep you comfortable.    ? Feed your baby whenever your baby wants to  eat. You are feeding a baby, not a clock!    ? Signs that your baby is ready to eat include:  starting to wake up, chewing on fists, moving  the face from side to side, opening and closing  the mouth, sticking out the tongue and turning  toward the breast when held. Crying is a late  sign of hunger, so look for the earlier signals  that your baby makes.    ? Feed your baby only human milk for at least  six months. The World Health Organization  recommends breastfeeding for the first year,  noting it is a elo baby who is  for  the first two years.    ? While in the hospital, plan to keep your baby  with you at all times, except for certain medical  procedures. This is an important time for you  and your baby to get to know each other and  practice breastfeeding.     Common questions    Below are questions that many women have about  breastfeeding. You will find further information in the  childbirth book your care team gave you. If you have more questions, speak with your midwife, nurse or doctor.    How do I involve my partner, family and  friends and get their help and support?    Sometimes family and friends dont understand why  you want to breastfeed. Perhaps they themselves  didnt breastfeed, or they werent  as infants. Tell them about the benefits of breastfeeding and how important it is to feed only human milk for the first six months or so. If you feed often, you will make plenty of milk to help your baby grow, fight illness and get the best start  possible.    After two to four weeks--once your baby is feeding  well and your milk supply is well established--your  partner and others can feed the baby your milk from  a cup, dropper or bottle. You can remove milk from  your breast (by hand or pump) and store it for later  use. This way, your baby will have your milk even  when youre away.    Remind everyone that there are lots of ways to help  that dont involve feeding: making meals, caring for  your other children, comforting the baby if they cries, changing diapers, running errands and more.    Is breastfeeding painful?    Not usually. There are ways to prevent pain and to  treat it if it happens.    The best ways to avoid pain are to feed your baby  often, use a good position and correctly latch your  baby onto the breast. These help prevent the two  most common sources of pain: sore nipples and  engorgement (overly full breasts). You will learn more about these topics in a breastfeeding class and in the hospital after your baby is born.         How much time does it take to breastfeed?    Some new mothers feel that all they do is breastfeed. In the early weeks, a baby eats 8 to 12 times per day. Sometimes babies will cluster feedings close together. At other times, there are longer stretches between feedings.    Remember, your newborns stomach will be about  the size of a walnut. Your baby wont eat much at each feeding. If you feed your baby often in the first days, your baby--and your milk supply--will grow. Your baby will eat more at each session, and you will need to nurse less often. Within a few weeks, most women find that breastfeeding is easier and takes less time than formula feeding.    How will I know if my baby is getting  enough milk?    Your body will start making milk as soon as your  baby is born. The more often you put your baby to  breast, the more milk you will make. You should avoid pacifiers for the first several weeks until breastfeeding  is well established--you want your baby to do all their sucking at the breast. This will help you make more milk.    There are signs that your baby is getting enough milk. You will learn these signs over time. For example:    ? You will count wet and soiled diapers, because  these show how much milk your baby is getting.    ? Your baby will seem satisfied after feedings.    ? Your baby will grow and gain weight after the first  few days.    Are there reasons why a woman shouldnt  breastfeed her baby?    There are a few medical concerns that prevent  breastfeeding. In mothers, these include being HIVpositive, having active or untreated TB (tuberculosis)  and using street drugs or some medicines. These  mothers usually choose infant formula for their  Babies.    A few women choose not to breastfeed for personal  reasons. But most mothers can breastfeed. If you are not sure if its okay to breastfeed, ask your care team.    Getting support    Before your baby is born, get as much information  as you can. Sign up for a breastfeeding class. Most  childbirth classes discuss breastfeeding, but a special class will give more in-depth information.    ? In [facility]: Go to [facility]  Center at Z-good.    ? In [facility]: Go to [email or web address].  Click on Classes-and Events at the bottom of the  page, then search for breastfeeding. Or, call [phone].    Remember, help is available from your hospital, clinic, lactation experts or online. If you need help after leaving the hospital:    ? Call your babys clinic. (If you dont have a clinic,  call [phone] and ask for a referral.)    ? Call a lactation consultant. (If you need help  finding a location that offers lactation support, call  [phone].)    ? Call [employer] (24 hours a  day) at 1-750-WVBALALIKEA [1-104.992.6020].    ? Call the Women, Infants and Children (WIC)  program at 1-499.658.9701.    ? Call the  National Womens Health Information  Center (English and Upper sorbian) at 1-350.279.8533 or  go to www.womenshealth.gov/breastfeeding.    ? Go to [email or web address][email or web address].     For informational purposes only. Not to replace the advice of your health care provider. Copyright   2010 [facility]. All rights reserved. Clinically reviewed by [facility]. Inmoo [id number] - REV .       Patient Education   HEALTHY PREGNANCY CARE: 30-34 WEEKS PREGNANT    You have made it to the final months of your pregnancy. By now, your baby is starting to fill out with some fat under his skin, fuzzy hair on his shoulders, and is gaining 4 to 6 ounces per week.    Discuss any travel plans with your midwife or physician.    Review possible changes in sexuality during later pregnancy and discuss these with your midwife or physician, as well as your partner. Alternative love-making positions may be more comfortable.    Discuss labor and delivery issues with your midwife or physician. If you had a  birth in the past, discuss a trial of labor with your midwife or physician. He or she may ask that you sign a consent form, if you wish to have a vaginal birth after  (). Ask your midwife or physician to explain your options for managing pain during your labor and delivery. Sometimes, during the birth process, an episiotomy may need to be cut in the vagina to make the opening bigger or let the baby come out quicker. You may want to discuss the episiotomy and how often it is needed with your midwife or physician.    Plan for your baby's care by selecting a child health care provider (Family physician, Pediatrician, or Pediatric Nurse Practitioner). Practice installing an infant car seat correctly in the car. Ask for car seat information as needed and make sure it is safe and will work in the car your baby will ride in. You will need a car seat to bring your baby home from the hospital. Check the  procedure for adding your baby to your health care plan. Review your decision about circumcision and ask for any information you need. As you buy and receive items for your baby, don't put a baby walker on your list. Walkers can be dangerous and can cause serious injury to your child. A safer option is a saucer-type play station, since it doesn't allow baby to travel across the floor.    Discuss your choices and plans for birth control with your midwife or physician. Women who are breastfeeding can still become pregnant. Use a birth control method if you want to lower your pregnancy risk. Talk to your midwife or physician if you are considering permanent birth control, such as tubal ligation or Essure. You may need to complete a consent form 30 days prior to delivery in order to have this done after you deliver.    Continue to watch for signs and symptoms of preeclampsia:     Sudden swelling of your face, hands, or feet     New vision problems such as blurring, double vision, or flashing lights    A severe headache not relieved with acetaminophen (Tylenol)    Sharp or stabbing pain in your right or middle upper abdomen    Watch for signs and symptoms of premature labor:     Regular contractions. This means having about 6 or more within 1 hour, even after you have had a glass of water and are resting.     A backache that starts and stops regularly.    An increase or change in vaginal discharge, such as heavy, mucus-like, watery, or bloody discharge.     Your water breaks or leaks.    If you have any of the above symptoms or any other concerns, call your provider or their clinic staff at [facility] at Phone: [phone]. If it's after clinic hours, physician patients should call the [facility] at [phone] ([phone]); midwife patients should call their answering service at [phone].    How can you care for yourself at home?   You can refer to the Starting Out Right book or find it  online at [email or web address] or [email or web address]/[email or web address]-starting-out-[email or web address]starting-out-right.html#p=8     You can sign up for a weekly parenting e-mail that gives support, tips and advice from health care professionals that starts with pregnancy and continues through the toddler years. To register, go to [email or web address] at any time during your pregnancy.

## 2021-06-18 NOTE — PROGRESS NOTES
ASSESSMENT AND PLAN:  Holly Harrell 18 y.o. female is seen here on 05/29/18 for follow-up of lower extremity pain.  She does not have evidence to suggest arthropathy, inflammatory or otherwise.  There is no features suggest connective tissue disease.  There is little to suggest fibromyalgia.  I have reassured her.  She is going to follow with her primary physician.   .Diagnoses and all orders for this visit:    Pain in both lower extremities    HISTORY OF PRESENTING ILLNESS:  Holly Harrell, 18 y.o., female is here for aloe up of lower extremity pain.  She was seen here for this recently.  She has taken diclofenac without significant help.  This is gone on for the past 8 possibly 10 years.  She recalls that this was at the time day when she was a child.  This is via the .  She reports that the pain comes and goes.  This seems to be randomness to it.  This is typically below the knees on the shin on the calf areas.  Sometimes it does go further cranially towards the thigh area.  Occasionally she has had pain in her upper extremity radiating down her wrist area.  There is no pain in the small joints of the hands or wrists themselves elbows knees ankles or the toes.  She reports no neck or back pain.  There is no history of trauma.  She has not observed any swelling of the joints including the small ones in the hands of the feet.  She reports no history of an intake of medication that was prescribed or over-the-counter.  She has not even taken Tylenol for this.  She reports no mouth ulcers, photosensitivity, pleuritic symptoms.  She has not had a seizure disorder.  She gets a good night, restorative sleep.  There is no features suggestive of irritable bladder or bowel syndrome. She describes herself otherwise in good health.  She is going to high school.  Further historical information and ADL limitations as noted in the multidimensional health assessment questionnaire attached in the EMR.  ALLERGIES:Review of  patient's allergies indicates no known allergies.    PAST MEDICAL/ACTIVE PROBLEMS/MEDICATION/ FAMILY HISTORY/SOCIAL DATA:  The patient has a family history of  No past medical history on file.  History   Smoking Status     Never Smoker   Smokeless Tobacco     Never Used     Patient Active Problem List   Diagnosis     Hearing Loss     Abdominal Pain In The Central Upper Belly (Epigastric)     Arthralgia     Pain in both lower extremities     No current outpatient prescriptions on file.     No current facility-administered medications for this visit.      DETAILED EXAMINATION  05/29/18  :  Vitals:    05/29/18 1203   BP: (!) 80/58   Patient Site: Right Arm   Patient Position: Sitting   Cuff Size: Adult Regular   Pulse: 80   Weight: 135 lb (61.2 kg)     Alert oriented. Head including the face is examined for malar rash, heliotropes, scarring, lupus pernio. Eyes examined for redness such as in episcleritis/scleritis, periorbital lesions.   Neck/ Face examined for parotid gland swelling, range of motion of neck.  Left upper and lower and right upper and lower extremities examined for tenderness, swelling, warmth of the appendicular joints, range of motion, edema, rash.  Some of the important findings included: There is no tenderness or swelling in any of the palpable appendical joints of the lower extremities.  No warmth of the knees.    LAB / IMAGING DATA:  ALT   Date Value Ref Range Status   05/21/2018 26 0 - 45 U/L Final     Albumin   Date Value Ref Range Status   05/21/2018 3.9 3.5 - 5.0 g/dL Final     Creatinine   Date Value Ref Range Status   05/21/2018 0.65 0.60 - 1.10 mg/dL Final       WBC   Date Value Ref Range Status   05/21/2018 8.8 4.0 - 11.0 thou/uL Final     Hemoglobin   Date Value Ref Range Status   05/21/2018 14.3 12.0 - 16.0 g/dL Final     Platelets   Date Value Ref Range Status   05/21/2018 192 140 - 440 thou/uL Final       Lab Results   Component Value Date    RF <15.0 07/23/2014    SEDRATE 17  07/23/2014

## 2021-06-19 NOTE — LETTER
Letter by Yu Mancia PA-C at      Author: Yu Mancia PA-C Service: -- Author Type: --    Filed:  Encounter Date: 6/13/2019 Status: (Other)         06/13/19    To Whom It May Concern:    Holly Harrell was seen today at [facility] for Pregnancy Confirmation.    She is 21w2d.    Due Date: Estimated Date of Delivery: 10/22/19     Thank you.    Yu Mancia PA-C

## 2021-06-22 ENCOUNTER — TRANSFERRED RECORDS (OUTPATIENT)
Dept: HEALTH INFORMATION MANAGEMENT | Facility: CLINIC | Age: 21
End: 2021-06-22

## 2021-06-25 NOTE — TELEPHONE ENCOUNTER
Patient wants to schedule appointment for having irregular period for the past 3 months. Patient reports lower abdominal and back pain/burning; says worse when she sits. Pain level is some days 10/10; 8-9/10 today.      Patient reports irregular bleeding amount from light to using 3-4 pads/day. Patient denies fever but says has a little bit of a chill.    Disposition: be see in 4 hrs or PCP triage.    Reviewed care advice with caller. Advised will call her back. Caller verbalized understanding.    Paged on-call provider, Dr. Molina, at 5:33 pm via Smart Web to call [facility] back.  Dr. Molina recommends patient schedule follow-up with her; patient does not need to be seen in the ED. Patient was informed and will do so.        Reason for Disposition    [1] Constant abdominal pain AND [2] present > 2 hours    Additional Information    Negative: Shock suspected (e.g., cold/pale/clammy skin, too weak to stand, low BP, rapid pulse)    Negative: Difficult to awaken or acting confused (e.g., disoriented, slurred speech)    Negative: Passed out (i.e., lost consciousness, collapsed and was not responding)    Negative: Sounds like a life-threatening emergency to the triager    Negative: SEVERE abdominal pain    Negative: SEVERE dizziness (e.g., unable to stand, requires support to walk, feels like passing out now)    Negative: SEVERE vaginal bleeding (e.g., soaking 2 pads or tampons per hour and present 2 or more hours; 1 menstrual cup every 2 hours)    Negative: Patient sounds very sick or weak to the triager    Negative: MODERATE vaginal bleeding (i.e., soaking 1 pad or tampon per hour and present > 6 hours; 1 menstrual cup every 6 hours)    Protocols used: VAGINAL BLEEDING - [id number]

## 2021-06-25 NOTE — PROGRESS NOTES
Assessment:     1. Routine health maintenance  2. Irregular menses  - Pregnancy (Beta-hCG, Qual), Urine  - Chlamydia trachomatis by PCR  - Neisseria gonorrhoeae by PCR  - Prolactin  - Dehydroepiandrosterone Sulfate, Serum (DHEAS)  - Testosterone, Free and Total  - Hepatic Profile  3. Screening for cervical cancer  - Gynecologic Cytology (PAP Smear)  4. Need for hepatitis C screening test  - Hepatitis C Antibody (Anti-HCV)  5. Encounter for gynecological examination without abnormal finding  6. Screening for thyroid disorder  - TSH  - T4, Free  7. Class 1 obesity with body mass index (BMI) of 32.0 to 32.9 in adult, unspecified obesity type, unspecified whether serious comorbidity present    Plan:     Education reviewed: low fat, low cholesterol diet, self breast exams and weight bearing exercise.  Contraception: none.  Reviewed daily prenatal vitamin use.   Discussed possible etiology of irregular menses including anovulation, possible thyroid abnormalities, rule out PCOS. Will collect blood work and consider imaging at follow up if needed.   We reviewed natural course of this, possible further evaluation, possible treatment.  Pap smear collected. Will follow results.     Subjective:       Holly Harrell is a 21 y.o. female who presents for an annual exam.  The patient is sexually active. The patient participates in regular exercise: no. The patient reports that there is not domestic violence in her life.   She has concerns of irregular menstrual cycle. Her last period is 4/1/21. Before this it had been several months. She wonders why this is happening. Before her last baby, her period was fairly regular though sometime irregular. No abdominal pain. No abnormal vaginal discharge. She admits to some weight gain. Has not lost any baby weight since she delivered two years ago.     Healthy Habits:   Regular Exercise: No  Sunscreen Use: Yes  Healthy Diet: Yes  Dental Visits Regularly: Yes  Seat Belt: Yes  Sexually active:  Yes  Self Breast Exam Monthly:No  Hemoccults: N/A  Flex Sig: N/A  Colonoscopy: N/A  Lipid Profile: No  Glucose Screen: No  Prevention of Osteoporosis: No  Last Dexa: No  Guns at Home:  No         Gynecologic History  Patient's last menstrual period was 2021 (approximate).  Contraception: none  Last Pap: none.     OB History    Para Term  AB Living   1 1 1     1   SAB TAB Ectopic Multiple Live Births         0 1      # Outcome Date GA Lbr Simon/2nd Weight Sex Delivery Anes PTL Lv   1 Term 19 37w4d 04:10  02:09 7 lb 9 oz (3.43 kg) M Vag-Spont Local, Inhalation, IV N HERACLIO       No current outpatient medications on file.     No current facility-administered medications for this visit.      Past Medical History:   Diagnosis Date     Pain in both lower extremities 2018     No past surgical history on file.  Patient has no known allergies.  No family history on file.  Social History     Socioeconomic History     Marital status: Single     Spouse name: Not on file     Number of children: Not on file     Years of education: Not on file     Highest education level: Not on file   Occupational History     Not on file   Social Needs     Financial resource strain: Not on file     Food insecurity     Worry: Not on file     Inability: Not on file     Transportation needs     Medical: Not on file     Non-medical: Not on file   Tobacco Use     Smoking status: Never Smoker     Smokeless tobacco: Never Used   Substance and Sexual Activity     Alcohol use: Never     Frequency: Never     Drug use: Never     Sexual activity: Not on file   Lifestyle     Physical activity     Days per week: Not on file     Minutes per session: Not on file     Stress: Not on file   Relationships     Social connections     Talks on phone: Not on file     Gets together: Not on file     Attends Christian service: Not on file     Active member of club or organization: Not on file     Attends meetings of clubs or organizations: Not on  file     Relationship status: Not on file     Intimate partner violence     Fear of current or ex partner: Not on file     Emotionally abused: Not on file     Physically abused: Not on file     Forced sexual activity: Not on file   Other Topics Concern     Not on file   Social History Narrative     Not on file       Review of Systems  General:  Denies problem  Eyes: Denies problem  Ears/Nose/Throat: Denies problem  Cardiovascular: Denies problem  Respiratory:  Denies problem  Gastrointestinal:  Denies problem, Genitourinary: Denies problem  Musculoskeletal:  Denies problem  Skin: Denies problem  Neurologic: Denies problem  Psychiatric: Denies problem  Endocrine: Denies problem  Heme/Lymphatic: Denies problem   Allergic/Immunologic: Denies problem         Objective:         Vitals:    06/02/21 0719   BP: 126/84   Pulse: 71   Resp: 16   SpO2: 99%   Weight: 182 lb (82.6 kg)       Physical Exam:  General Appearance: Alert, cooperative, no distress, appears stated age  Head: Normocephalic, without obvious abnormality, atraumatic  Eyes: PERRL, conjunctiva/corneas clear, EOM's intact  Ears: Normal TM's and external ear canals, both ears  Nose: Nares normal, septum midline,mucosa normal, no drainage  Throat: Lips, mucosa, and tongue normal; teeth and gums normal  Neck: Supple, symmetrical, trachea midline, no adenopathy;  thyroid: not enlarged, symmetric, no tenderness/mass/nodules; no carotid bruit or JVD  Back: Symmetric, no curvature, ROM normal, no CVA tenderness  Lungs: Clear to auscultation bilaterally, respirations unlabored  Breasts: No breast masses, tenderness, asymmetry, or nipple discharge.  Heart: Regular rate and rhythm, S1 and S2 normal, no murmur, rub, or gallop  Abdomen: Soft, non-tender, bowel sounds active all four quadrants,  no masses, no organomegaly  Pelvic:Normally developed genitalia with no external lesions or eruptions. Vagina and cervix show no lesions, inflammation, discharge or tenderness. No  cystocele, No rectocele. Uterus normal.  No adnexal mass or tenderness.  Extremities: Extremities normal, atraumatic, no cyanosis or edema  Skin: Skin color, texture, turgor normal, no rashes or lesions  Lymph nodes: Cervical, supraclavicular, and axillary nodes normal  Neurologic: Normal     Lab  Recent Results (from the past 240 hour(s))   Pregnancy (Beta-hCG, Qual), Urine   Result Value Ref Range    Pregnancy Test, Urine Negative Negative   TSH   Result Value Ref Range    TSH 2.09 0.30 - 5.00 uIU/mL   T4, Free   Result Value Ref Range    Free T4 1.1 0.7 - 1.8 ng/dL   Hepatitis C Antibody (Anti-HCV)   Result Value Ref Range    Hepatitis C Ab Negative Negative   HM1 (CBC with Diff)   Result Value Ref Range    WBC 7.8 4.0 - 11.0 thou/uL    RBC 5.02 3.80 - 5.40 mill/uL    Hemoglobin 14.9 12.0 - 16.0 g/dL    Hematocrit 45.6 35.0 - 47.0 %    MCV 91 80 - 100 fL    MCH 29.7 27.0 - 34.0 pg    MCHC 32.7 32.0 - 36.0 g/dL    RDW 12.2 11.0 - 14.5 %    Platelets 232 140 - 440 thou/uL    MPV 9.6 7.0 - 10.0 fL    Neutrophils % 53 50 - 70 %    Lymphocytes % 35 20 - 40 %    Monocytes % 8 2 - 10 %    Eosinophils % 4 0 - 6 %    Basophils % 0 0 - 2 %    Immature Granulocyte % 0 <=0 %    Neutrophils Absolute 4.1 2.0 - 7.7 thou/uL    Lymphocytes Absolute 2.7 0.8 - 4.4 thou/uL    Monocytes Absolute 0.6 0.0 - 0.9 thou/uL    Eosinophils Absolute 0.3 0.0 - 0.4 thou/uL    Basophils Absolute 0.0 0.0 - 0.2 thou/uL    Immature Granulocyte Absolute 0.0 <=0.0 thou/uL   Prolactin   Result Value Ref Range    Prolactin 10.9 0.0 - 20.0 ng/mL   Dehydroepiandrosterone Sulfate, Serum (DHEAS)   Result Value Ref Range    DHEAS 199 65 - 380 ug/dL   Testosterone, Free and Total   Result Value Ref Range    Testosterone, Total 49 8 - 60 ng/dL    Sex Hormone Bind Globulin 27 (L) 30 - 135 nmol/L    Free Testosterone Calc 0.99 (H) 0.08 - 0.74 ng/dL   Hepatic Profile   Result Value Ref Range    Bilirubin, Total 0.9 0.0 - 1.0 mg/dL    Bilirubin, Direct 0.3 <=0.5  mg/dL    Protein, Total 7.1 6.0 - 8.0 g/dL    Albumin 4.1 3.5 - 5.0 g/dL    Alkaline Phosphatase 74 45 - 120 U/L    AST 42 (H) 0 - 40 U/L    ALT 90 (H) 0 - 45 U/L   Chlamydia trachomatis by PCR    Specimen: Urine   Result Value Ref Range    Specimen Description Urine     Chlamydia trachomatis PCR Negative NEG   Neisseria gonorrhoeae by PCR    Specimen: Urine   Result Value Ref Range    Spec Description Urine     Neisseria gonorroheae PCR Negative NEG   Gynecologic Cytology (PAP Smear)   Result Value Ref Range    Case Report       Gynecologic Cytology Report                       Case: [id number]                                   Authorizing Provider:  Nicho Molina MD          Collected:           06/02/2021 [facility]              Ordering Location:     [facility]   Received:            06/02/2021 [address] [place] [address]                                                                  First Screen:          Todd Pena CT                                                                           (ASCP)                                                                       Specimen:    SUREPATH PAP, SCREENING, Endocervical/cervical                                             Interpretation  Negative for squamous intraepithelial lesion or malignancy.      Negative for squamous intraepithelial lesion or malignancy    Result Flag Normal Normal    Specimen Adequacy       Satisfactory for evaluation, endocervical/transformation zone component present    HPV Reflex? Yes if Abnormal     HIGH RISK No     LMP/Menopause Date 4/2/21     Abnormal Bleeding No     Pt Status n/a     Birth Control/Hormones None     Previous Normal/Date none     Prev Abn Date/Dx none     Cervical Appearance normal

## 2021-06-26 NOTE — PROGRESS NOTES
Subjective:  21 y.o. female with concerns of continued vaginal bleeding.  She states this is been present for proximately 3 months.  She is not specifically planning a pregnancy but would not be completely disappointed if she were pregnant.  States she is having some low back pain and suprapubic pain as well.  There is some dysuria.  No flank pain or fevers.    Wt Readings from Last 3 Encounters:   06/15/21 180 lb 1.9 oz (81.7 kg)   06/02/21 182 lb (82.6 kg)   01/13/20 171 lb (77.6 kg)   Her weight in June 2019 was 155 pounds.    Before her last baby, her period was fairly regular though sometime irregular    No outpatient medications prior to visit.     No facility-administered medications prior to visit.       Social History     Tobacco Use   Smoking Status Never Smoker   Smokeless Tobacco Never Used      Objective:  /80 (Patient Site: Left Arm, Patient Position: Sitting, Cuff Size: Adult Regular)   Pulse (!) 59   Temp 98  F (36.7  C) (Temporal)   Resp 12   Wt 180 lb 1.9 oz (81.7 kg)   LMP 03/15/2021 (Approximate)   Breastfeeding No   BMI 32.42 kg/m    GENERAL: alert, not distressed  CHEST: clear, no rales, rhonchi, or wheezes  CARDIAC: regular without murmur, gallop, or rub  ABDOMEN: mild suprapubic tender ness, soft, non tender, non distended, normal bowel sounds, no CVA tenderness.    Recent Results (from the past 24 hour(s))   Urinalysis   Result Value Ref Range    Color, UA Yellow Colorless, Yellow, Straw, Light Yellow    Clarity, UA Clear Clear    Glucose, UA Negative Negative    Protein, UA Negative Negative    Bilirubin, UA Negative Negative    Urobilinogen, UA 0.2 E.U./dL 0.2 E.U./dL, 1.0 E.U./dL    pH, UA 6.5 5.0 - 8.0    Blood, UA Large (!) Negative    Ketones, UA Negative Negative    Nitrite, UA Negative Negative    Leukocytes, UA Small (!) Negative    Specific Gravity, UA 1.025 1.005 - 1.030    RBC, UA 25-50 (!) None Seen, 0-2 hpf    WBC, UA 5-10 (!) None Seen, 0-5 hpf    Bacteria, UA Few  (!) None Seen    Squam Epithel, UA 5-10 (!) None Seen, 0-5 lpf   Pregnancy (Beta-hCG, Qual), Urine   Result Value Ref Range    Pregnancy Test, Urine Negative Negative      Previous PCOS lab work-up included normal prolactin, CBC, hepatitis C, Pap smear, DHEA-S, total testosterone.    Free testosterone is mildly elevated at 0.99.  Mild decrease in sex hormone binding globulin at 27    Assessment and Plan:   1. Abnormal uterine bleeding (AUB)  Could be due to PCOS type picture.  Evaluate ultrasound for polycystic ovaries and also for structural uterine lesion such as polyp or fibroid.  Patient would consider OCPs to manage irregular bleeding if work-up is otherwise unremarkable.  Weight loss discussed to lower estrogen baseline levels.  - US Pelvis With Transvaginal Non OB; Future  - Pregnancy (Beta-hCG, Qual), Urine    2. Dysuria  Some blood from uterine bleeding is likely but other signs of potential infection.  Will treat with nitrofurantoin and check urine culture.  - Urinalysis  - nitrofurantoin, macrocrystal-monohydrate, (MACROBID) 100 MG capsule; Take 1 capsule (100 mg total) by mouth 2 (two) times a day for 7 days.  Dispense: 14 capsule; Refill: 0  - Culture, Urine

## 2021-07-03 NOTE — ADDENDUM NOTE
Addendum Note by Andres Steinberg MD at 8/27/2019 11:15 AM     Author: Andres Steinberg MD Service: -- Author Type: Physician    Filed: 8/28/2019  3:14 PM Encounter Date: 8/27/2019 Status: Signed    : Andres Steinberg MD (Physician)    Addended by: ANDRES STEINBERG on: 8/28/2019 03:14 PM        Modules accepted: SmartSet

## 2021-07-04 NOTE — ADDENDUM NOTE
Addendum Note by Andres Steinberg MD at 6/15/2021  8:20 AM     Author: Andres Steinberg MD Service: -- Author Type: Physician    Filed: 6/17/2021  9:26 AM Encounter Date: 6/15/2021 Status: Signed    : Andres Steinberg MD (Physician)    Addended by: ANDRES STEINBERG on: 6/17/2021 09:26 AM        Modules accepted: Orders

## 2021-07-04 NOTE — LETTER
Letter by Chio Munguia RN at      Author: Chio Munguia RN Service: -- Author Type: --    Filed:  Encounter Date: 6/8/2021 Status: (Other)         Holly Harrell  [address] [place] [address]             June 8, 2021         Dear MsMary Julio Cesar,    We are happy to inform you that your recent Pap smear is normal.    It is recommended that you have your next Pap smear in 3 years. You will also need to return to the clinic every year for an annual wellness visit.    If you have additional questions regarding this result, please contact our office and we will be happy to assist you.      Sincerely,    Your [facility] Team

## 2021-07-04 NOTE — LETTER
Letter by Andres Wells MD at      Author: Andres Wells MD Service: -- Author Type: --    Filed:  Encounter Date: 6/15/2021 Status: (Other)         Opal 15, 2021     Patient: Holly Harrell   YOB: 2000   Date of Visit: 6/15/2021       To Whom It May Concern:    Please excuse Ms. Holly Harrell from work for medical related issues on 6/11/21 through 6/13/21.    If you have any questions or concerns, please don't hesitate to call.    Sincerely,            Electronically signed by Andres Wells MD   6/15/2021, 8:33 AM

## 2021-07-06 VITALS
BODY MASS INDEX: 32.76 KG/M2 | WEIGHT: 182 LBS | SYSTOLIC BLOOD PRESSURE: 126 MMHG | HEART RATE: 71 BPM | OXYGEN SATURATION: 99 % | DIASTOLIC BLOOD PRESSURE: 84 MMHG | RESPIRATION RATE: 16 BRPM

## 2021-07-06 VITALS
WEIGHT: 180.12 LBS | DIASTOLIC BLOOD PRESSURE: 80 MMHG | BODY MASS INDEX: 32.42 KG/M2 | HEART RATE: 59 BPM | RESPIRATION RATE: 12 BRPM | SYSTOLIC BLOOD PRESSURE: 115 MMHG | TEMPERATURE: 98 F

## 2022-08-17 ENCOUNTER — OFFICE VISIT (OUTPATIENT)
Dept: MIDWIFE SERVICES | Facility: CLINIC | Age: 22
End: 2022-08-17
Payer: COMMERCIAL

## 2022-08-17 VITALS
BODY MASS INDEX: 31.71 KG/M2 | WEIGHT: 179 LBS | OXYGEN SATURATION: 97 % | HEART RATE: 80 BPM | HEIGHT: 63 IN | DIASTOLIC BLOOD PRESSURE: 68 MMHG | SYSTOLIC BLOOD PRESSURE: 106 MMHG

## 2022-08-17 DIAGNOSIS — N89.8 VAGINAL DISCHARGE: Primary | ICD-10-CM

## 2022-08-17 DIAGNOSIS — N93.9 ABNORMAL UTERINE BLEEDING (AUB): ICD-10-CM

## 2022-08-17 LAB
CLUE CELLS: ABNORMAL
HCG UR QL: NEGATIVE
TRICHOMONAS, WET PREP: ABNORMAL
WBC'S/HIGH POWER FIELD, WET PREP: ABNORMAL
YEAST, WET PREP: ABNORMAL

## 2022-08-17 PROCEDURE — 87210 SMEAR WET MOUNT SALINE/INK: CPT | Performed by: MIDWIFE

## 2022-08-17 PROCEDURE — 87591 N.GONORRHOEAE DNA AMP PROB: CPT | Performed by: MIDWIFE

## 2022-08-17 PROCEDURE — 99204 OFFICE O/P NEW MOD 45 MIN: CPT | Performed by: MIDWIFE

## 2022-08-17 PROCEDURE — 81025 URINE PREGNANCY TEST: CPT | Performed by: MIDWIFE

## 2022-08-17 PROCEDURE — 87491 CHLMYD TRACH DNA AMP PROBE: CPT | Performed by: MIDWIFE

## 2022-08-17 NOTE — PROGRESS NOTES
S/O: Holly is here by herself for menstrual issues. Mami speaking and used professional  for the visit.  Patient states that she is bleeding almost every day.  She has some dizziness and some back pain.  Patient states that she has vaginal discharge with an odor (this was not noted by the CNM), but it is difficult to assess with the bleeding so frequent.   Patient states that she saw her primary doctor last year (6/15/2021) and he recommended taking an oral contraceptive pill to help her with the bleeding.  She states that she took the pill for almost 1 month and then stopped it because she did not like how it made her feel.  And her parents did not want her taking it.  Her doctor also ordered a pelvic ultrasound which showed normal results.  Patient also was referred to Dr. Mitchell at [facility]. Patient was unclear about what the doctor told her regarding why she is having so much bleeding and unclear about follow-up.   Addendum: This CNM reviewed Dr. Mitchell's note: Abnormal uterine bleeding with elevated testosterone level and normal pelvic ultrasound.  Recommended 3 months of OCP's then pt may stop to see how her bleeding is affected. may try to achieve pregnancy then.  Patient is .  She has a 2-1/2-year-old son.  I asked her about the OCPs and she said she just did not like how it made her feel and she did not want to keep taking it.  She understands that she did not give it much of a chance to work.  When asked if she would like to try the OCPs again for a 3-month trial, she declined.  Patient is also not interested in a Mirena IUD to help control the bleeding as she states that she might like to get pregnant again soon.  Patient would like to move forward with another pelvic ultrasound.     Patient occupation: na  Medications: None  Please see past medical history section.   Please see family history section.  Patient has never had any surgery.  Last Pap on 6/2/2021 normal  General  health/lifestyle:   Patient states she always wears a seatbelt.  There are no firearms in the home   There are working fire detectors in the home.  No smoking or tobacco use.   In a typical week, exercise includes: Minimal amounts of walking  Diet: Well-balanced  In a typical week she drinks 0 alcoholic drinks.  No recreational drug use.   No physical, sexual or emotional abuse.   She is in a safe, monogamous relationship with her . .  Sexual history/family planning: Would like to achieve pregnancy in the next year possibly.  They have a 2-1/2-year-old  For birth control she uses: Condoms  Patient is sexually active. In the last 12 months she has had 1 partner.  Her partners are male.  Types of sexual activity practiced: N/A  Last menstrual period: Continuous  Her periods come to often to really tell a cycle  They are usually light to moderate flow and sometimes crampy patient goes through 3-4 pads per day, no tampon use  PMS includes N/A  Patient does not want STI testing today.  But okays wet prep and GC chlamydia  Review of systems: Noncontributory      O: Patient is well nourished, well groomed with normal body habitus.  Respiratory normal, not labored breathing  Cardiovascular well perfused  Breast deferred  Abdomen no masses, no guarding, no tenderness, no organomegaly.  External genitalia and BUS with no enlargement, no lesions noted.  Vagina has normal tone, physiologic discharge, rugae present. Dark red bleeding like mod menses. Pt. Notes odor and discharge, but not noticed by this CNM  Urinary without discharge, no CVA tenderness.    Cervix no lesions not friable, no cervical motion tenderness.  Uterus non-tender, seemed slightly enlarged, slightly boggy, difficult exam d/t pt's discomfort with vaginal exam.  Adnexa no tenderness, no nodularities or masses palpated .  Psych alert and oriented ×3.  Appropriate judgment, insight, normal affect.      A: abnormal uterine bleeding    P: Pelvic  exam today  2. Wet prep neg  3. GC/Chlamydia pending  4. UPT neg  5. US non-OB pelvic  6. Consider ref to OB/gyn after review results of US if abnormal.  If normal ultrasound, explain possibility of another 3-month OCP trial then ending and attempting pregnancy if patient desires.  7.  Supplements for preconception discussed including prenatal vitamin, calcium, vitamin D3, iron.  8. CBC (Future lab)    Total time spent on the date of this encounter doing chart review, review of test results, patient visit, the physical exam & procedure, education, counseling, developing this plan of care, and documenting = 45 minutes.    Mary WASHINGTON CNM

## 2022-08-18 LAB
C TRACH DNA SPEC QL PROBE+SIG AMP: NEGATIVE
N GONORRHOEA DNA SPEC QL NAA+PROBE: NEGATIVE

## 2022-08-22 ENCOUNTER — TELEPHONE (OUTPATIENT)
Dept: MIDWIFE SERVICES | Facility: CLINIC | Age: 22
End: 2022-08-22

## 2022-08-22 ENCOUNTER — HOSPITAL ENCOUNTER (OUTPATIENT)
Dept: ULTRASOUND IMAGING | Facility: HOSPITAL | Age: 22
Discharge: HOME | End: 2022-08-22
Attending: MIDWIFE | Admitting: MIDWIFE
Payer: COMMERCIAL

## 2022-08-22 DIAGNOSIS — N93.9 ABNORMAL UTERINE BLEEDING (AUB): ICD-10-CM

## 2022-08-22 DIAGNOSIS — N93.9 ABNORMAL UTERINE BLEEDING: Primary | ICD-10-CM

## 2022-08-22 PROCEDURE — 76830 TRANSVAGINAL US NON-OB: CPT

## 2022-08-22 NOTE — TELEPHONE ENCOUNTER
PHONE NOTE: called pt with US results. Used professional Mami  for the call. Discussed the results of the US in detail. Explained that although the US are considered normal, it is not normal to be bleeding everyday. She could either take 3 months of OCP's as discussed last year with Dr. Wells and Dr. Mitchell then see what happens with her bleeding, then attempt conception if she desires. OR she can go back to Dr. Mitchell to discuss further. She prefers to see Dr. Mitchell to get more information about her endometrial lining and continued bleeding almost everyday.the patient wants to now if she chooses to not take the course of OCP's, is it safe to attempt pregnancy? Pt would like a referral to clarify these issues with Dr. Mitchell. Explained that Dr. Mitchell, or other OB, might decide to do further testing.  (Including possible endometrial biopsy and/or labs) Referral placed.  Told pt that after she sees the OB and gets the okay then she can come back to the midwives if she achieves conception.  Patient has no further questions.  Patient knows that she can call the midwives with questions or for some reason she does not hear back from [facility] to schedule an appointment she can call us at [phone]

## 2022-10-01 ENCOUNTER — HEALTH MAINTENANCE LETTER (OUTPATIENT)
Age: 22
End: 2022-10-01

## 2022-11-09 ENCOUNTER — ALLIED HEALTH/NURSE VISIT (OUTPATIENT)
Dept: FAMILY MEDICINE | Facility: CLINIC | Age: 22
End: 2022-11-09
Payer: COMMERCIAL

## 2022-11-09 DIAGNOSIS — Z23 ENCOUNTER FOR IMMUNIZATION: Primary | ICD-10-CM

## 2022-11-09 PROCEDURE — 90686 IIV4 VACC NO PRSV 0.5 ML IM: CPT

## 2022-11-09 PROCEDURE — 99207 PR NO CHARGE NURSE ONLY: CPT

## 2022-11-09 PROCEDURE — 90471 IMMUNIZATION ADMIN: CPT

## 2022-12-15 ENCOUNTER — OFFICE VISIT (OUTPATIENT)
Dept: FAMILY MEDICINE | Facility: CLINIC | Age: 22
End: 2022-12-15
Payer: COMMERCIAL

## 2022-12-15 ENCOUNTER — NURSE TRIAGE (OUTPATIENT)
Dept: NURSING | Facility: CLINIC | Age: 22
End: 2022-12-15

## 2022-12-15 VITALS
SYSTOLIC BLOOD PRESSURE: 108 MMHG | HEART RATE: 92 BPM | WEIGHT: 179 LBS | BODY MASS INDEX: 31.71 KG/M2 | DIASTOLIC BLOOD PRESSURE: 73 MMHG | OXYGEN SATURATION: 95 % | RESPIRATION RATE: 26 BRPM | TEMPERATURE: 99.1 F

## 2022-12-15 DIAGNOSIS — J06.9 VIRAL URI: ICD-10-CM

## 2022-12-15 DIAGNOSIS — Z33.1 PREGNANCY, INCIDENTAL: Primary | ICD-10-CM

## 2022-12-15 PROCEDURE — 99213 OFFICE O/P EST LOW 20 MIN: CPT | Performed by: FAMILY MEDICINE

## 2022-12-15 RX ORDER — DIPHENHYDRAMINE HCL 25 MG
25 CAPSULE ORAL EVERY 6 HOURS PRN
Qty: 30 CAPSULE | Refills: 0 | Status: ON HOLD | OUTPATIENT
Start: 2022-12-15 | End: 2023-05-28

## 2022-12-15 NOTE — PROGRESS NOTES
OUTPATIENT VISIT NOTE                                                   Date of Visit: 12/15/2022     Chief Complaint   Patient presents with:  Cough: X last weekend. Congestion, chest pain and vomit when cough too much. Headaches and dizziness. Chills and body aches. Pt states currently pregnant but unsure how far along.  Fever: On/off x Sunday night but passed by Tuesday night.            History of Present Illness   Holly Harrell is a 22 year old female has been coughing for the last 4-5 days, worsening.  Has felt hot 4 days ago.  Has used tylenol.    All family members have been sick.    lmp 10/5/2022  EGA 10+1 week. Has 1st ob appointment scheduled 1/6/2023       MEDICATIONS   Current Outpatient Medications   Medication     diphenhydrAMINE (BENADRYL) 25 MG capsule     No current facility-administered medications for this visit.         SOCIAL HISTORY   Social History     Tobacco Use     Smoking status: Never     Smokeless tobacco: Never   Substance Use Topics     Alcohol use: Never           Physical Exam   Vitals:    12/15/22 1610   BP: 108/73   BP Location: Right arm   Patient Position: Sitting   Cuff Size: Adult Regular   Pulse: 92   Resp: 26   Temp: 99.1  F (37.3  C)   TempSrc: Oral   SpO2: 95%   Weight: 81.2 kg (179 lb)        GENERAL:   Alert. Oriented.  EYES: Clear  HENT:  Ears: R TM pearly gray. Normal landmarks. L TM pearly gray.  Normal landmarks  Nose: Clear.  Sinuses: Nontender.  Oropharynx:  No erythema. No exudate.  NECK: Supple. No adenopathy.  LUNGS: Clear to ascultation.  No crackles.  No wheezing  HEART: RRR  SKIN:  No rash.  Fundus not palpable.  No FHTs by doptone         Assessment and Plan     Pregnancy, incidental  Has initial ob appointment set up    Viral URI  No respiratory distress.  May use benadryl or honey for cough  - diphenhydrAMINE (BENADRYL) 25 MG capsule  Dispense: 30 capsule; Refill: 0                   Discussed signs / symptoms that warrant urgent / emergent medical attention.      Recheck if worsening or not improving.       Mandy Luo MD          Pertinent History     The following portions of the patient's history were reviewed and updated as appropriate: allergies, current medications, past family history, past medical history, past social history, past surgical history and problem list.

## 2022-12-15 NOTE — TELEPHONE ENCOUNTER
2nd level triage message sent to Dr Wells.  Please advise.    Shirley De RN  [facility]

## 2022-12-15 NOTE — TELEPHONE ENCOUNTER
Contacted patient using a Mami  and relayed message below from Dr Wells.   Patient going to [facility].    Shirley De RN  [facility]

## 2022-12-15 NOTE — TELEPHONE ENCOUNTER
Pt calling with , Surya LEON [id number]:    States she is in early pregnancy - has not yet had first OB appt.  Cold/cough beginning on 12/10/22   Pt feels she was running a fever for a couple days but does not have a thermometer.  Cough is very bothersome- difficult to even talk  Hard to breath through nose d/t nasal congestion  Cough is mostly non productive    Pt Denies;  Fever at time of this call  Chest pain - (only when coughing)    According to the protocol, patient should wait to hear from PCP within 30 minutes or go to  .  Care advice given. Patient verbalizes understanding and agrees with plan of care.     Msg routed to PCP/Care Team to advise Pt.    Gardenia Moscoso RN, Nurse Advisor 2:54 PM 12/15/2022    Additional Information    Negative: SEVERE difficulty breathing (e.g., struggling for each breath, speaks in single words)    Negative: Lips or face are bluish now and persists when not coughing    Negative: Sounds like a life-threatening emergency to the triager    Negative: Chest pain is main symptom    Negative: Cough and < 3 weeks duration    Negative: Previous asthma attacks and this feels like an asthma attack    Negative: MODERATE difficulty breathing (e.g., speaks in phrases, SOB even at rest, pulse 100-120) and still present when not coughing    Negative: Chest pain  (Exception: MILD central chest pain, present only when coughing.)    Negative: Increasing difficulty breathing and always has some difficulty breathing    Negative: Patient sounds very sick or weak to the triager    Negative: MILD difficulty breathing (e.g., minimal/no SOB at rest, SOB with walking, pulse < 100) and still present when not coughing  (Exception: No change from usual, chronic shortness of breath.)    Negative: Coughed up blood and > 1 tablespoon (15 ml)  (Exception: Blood-tinged sputum.)    Negative: Fever > 103 F (39.4 C)    Negative: Fever > 101 F (38.3 C) and age > 60 years    Negative: Fever > 100.0 F (37.8 C) and  bedridden (e.g., nursing home patient, CVA, chronic illness, recovering from surgery)    Negative: Fever > 100.0 F (37.8 C) and diabetes mellitus or weak immune system (e.g., HIV positive, cancer chemo, splenectomy, organ transplant, chronic steroids)    Negative: SEVERE coughing spells (e.g., whooping sound after coughing, vomiting after coughing)    Negative: Continuous (nonstop) coughing interferes with work or school and no improvement using cough treatment per Care Advice    Negative: Fever present > 3 days (72 hours)    Negative: Coughing up danielle-colored sputum    Negative: Change in color of sputum (e.g., from white to yellow-green sputum)    Negative: Increase in amount of sputum    Negative: Taking an ACE Inhibitor medication (e.g., benazepril/LOTENSIN, captopril/CAPOTEN, enalapril/VASOTEC, lisinopril/ZESTRIL)    Negative: Chest or rib pain and only occurs while coughing    Negative: Sinus pain or pressure (around cheekbone or eye)    Negative: Nasal discharge and present > 10 days    Negative: Blood-tinged sputum has been coughed up and more than once    Negative: History of asthma or has mild wheezing    Negative: Exposure to TB (Tuberculosis)    Negative: Patient wants to be seen    Negative: History of gastric reflux and intermittent symptoms of sour taste in mouth and dry cough    Negative: Dry lingering cough and recent cold symptoms (e.g., runny nose, fever)    Negative: Cough lasts > 3 weeks    Protocols used: COUGH - CHRONIC-A-OH

## 2023-01-05 ENCOUNTER — LAB REQUISITION (OUTPATIENT)
Dept: LAB | Facility: CLINIC | Age: 23
End: 2023-01-05
Payer: COMMERCIAL

## 2023-01-05 DIAGNOSIS — Z36.89 ENCOUNTER FOR OTHER SPECIFIED ANTENATAL SCREENING: ICD-10-CM

## 2023-01-05 LAB
BASOPHILS # BLD AUTO: 0 10E3/UL (ref 0–0.2)
BASOPHILS NFR BLD AUTO: 0 %
EOSINOPHIL # BLD AUTO: 0.1 10E3/UL (ref 0–0.7)
EOSINOPHIL NFR BLD AUTO: 1 %
ERYTHROCYTE [DISTWIDTH] IN BLOOD BY AUTOMATED COUNT: 12.9 % (ref 10–15)
HCT VFR BLD AUTO: 44.7 % (ref 35–47)
HGB BLD-MCNC: 14.4 G/DL (ref 11.7–15.7)
IMM GRANULOCYTES # BLD: 0 10E3/UL
IMM GRANULOCYTES NFR BLD: 0 %
LYMPHOCYTES # BLD AUTO: 2.5 10E3/UL (ref 0.8–5.3)
LYMPHOCYTES NFR BLD AUTO: 25 %
MCH RBC QN AUTO: 29.7 PG (ref 26.5–33)
MCHC RBC AUTO-ENTMCNC: 32.2 G/DL (ref 31.5–36.5)
MCV RBC AUTO: 92 FL (ref 78–100)
MONOCYTES # BLD AUTO: 0.5 10E3/UL (ref 0–1.3)
MONOCYTES NFR BLD AUTO: 5 %
NEUTROPHILS # BLD AUTO: 6.9 10E3/UL (ref 1.6–8.3)
NEUTROPHILS NFR BLD AUTO: 69 %
NRBC # BLD AUTO: 0 10E3/UL
NRBC BLD AUTO-RTO: 0 /100
PLATELET # BLD AUTO: 300 10E3/UL (ref 150–450)
RBC # BLD AUTO: 4.85 10E6/UL (ref 3.8–5.2)
WBC # BLD AUTO: 10 10E3/UL (ref 4–11)

## 2023-01-05 PROCEDURE — 86762 RUBELLA ANTIBODY: CPT | Mod: ORL | Performed by: NURSE PRACTITIONER

## 2023-01-05 PROCEDURE — 86850 RBC ANTIBODY SCREEN: CPT | Mod: ORL | Performed by: NURSE PRACTITIONER

## 2023-01-05 PROCEDURE — 87389 HIV-1 AG W/HIV-1&-2 AB AG IA: CPT | Performed by: NURSE PRACTITIONER

## 2023-01-05 PROCEDURE — 86803 HEPATITIS C AB TEST: CPT | Performed by: NURSE PRACTITIONER

## 2023-01-05 PROCEDURE — 85025 COMPLETE CBC W/AUTO DIFF WBC: CPT | Mod: ORL | Performed by: NURSE PRACTITIONER

## 2023-01-05 PROCEDURE — 87340 HEPATITIS B SURFACE AG IA: CPT | Mod: ORL | Performed by: NURSE PRACTITIONER

## 2023-01-05 PROCEDURE — 86901 BLOOD TYPING SEROLOGIC RH(D): CPT | Mod: ORL | Performed by: NURSE PRACTITIONER

## 2023-01-05 PROCEDURE — 86780 TREPONEMA PALLIDUM: CPT | Mod: ORL | Performed by: NURSE PRACTITIONER

## 2023-01-06 LAB
ABO/RH(D): NORMAL
ANTIBODY SCREEN: NEGATIVE
HBV SURFACE AG SERPL QL IA: NONREACTIVE
HCV AB SERPL QL IA: NONREACTIVE
HIV 1+2 AB+HIV1 P24 AG SERPL QL IA: NONREACTIVE
HOLD SPECIMEN: NORMAL
RUBV IGG SERPL QL IA: 3.22 INDEX
RUBV IGG SERPL QL IA: POSITIVE
SPECIMEN EXPIRATION DATE: NORMAL
T PALLIDUM AB SER QL: NONREACTIVE

## 2023-02-02 ENCOUNTER — LAB REQUISITION (OUTPATIENT)
Dept: LAB | Facility: CLINIC | Age: 23
End: 2023-02-02
Payer: COMMERCIAL

## 2023-02-02 DIAGNOSIS — Z11.3 ENCOUNTER FOR SCREENING FOR INFECTIONS WITH A PREDOMINANTLY SEXUAL MODE OF TRANSMISSION: ICD-10-CM

## 2023-02-02 DIAGNOSIS — Z34.91 ENCOUNTER FOR SUPERVISION OF NORMAL PREGNANCY, UNSPECIFIED, FIRST TRIMESTER: ICD-10-CM

## 2023-02-02 PROCEDURE — 87491 CHLMYD TRACH DNA AMP PROBE: CPT | Mod: ORL | Performed by: OBSTETRICS & GYNECOLOGY

## 2023-02-02 PROCEDURE — 87086 URINE CULTURE/COLONY COUNT: CPT | Mod: ORL | Performed by: OBSTETRICS & GYNECOLOGY

## 2023-02-02 PROCEDURE — 87591 N.GONORRHOEAE DNA AMP PROB: CPT | Mod: ORL | Performed by: OBSTETRICS & GYNECOLOGY

## 2023-02-03 LAB
C TRACH DNA SPEC QL PROBE+SIG AMP: NEGATIVE
N GONORRHOEA DNA SPEC QL NAA+PROBE: NEGATIVE

## 2023-02-04 LAB — BACTERIA UR CULT: NORMAL

## 2023-03-01 ENCOUNTER — LAB REQUISITION (OUTPATIENT)
Dept: LAB | Facility: CLINIC | Age: 23
End: 2023-03-01
Payer: COMMERCIAL

## 2023-03-01 DIAGNOSIS — Z3A.13 13 WEEKS GESTATION OF PREGNANCY: ICD-10-CM

## 2023-03-01 DIAGNOSIS — Z36.82 ENCOUNTER FOR ANTENATAL SCREENING FOR NUCHAL TRANSLUCENCY: ICD-10-CM

## 2023-03-01 PROCEDURE — 82105 ALPHA-FETOPROTEIN SERUM: CPT | Mod: ORL | Performed by: OBSTETRICS & GYNECOLOGY

## 2023-03-03 LAB
# FETUSES US: NORMAL
AFP MOM SERPL: 1.35
AFP SERPL-MCNC: 47 NG/ML
AGE - REPORTED: 24.5 YR
CURRENT SMOKER: NO
FAMILY MEMBER DISEASES HX: NO
GA METHOD: NORMAL
GA: NORMAL WK
IDDM PATIENT QL: NO
INTEGRATED SCN PATIENT-IMP: NORMAL
SPECIMEN DRAWN SERPL: NORMAL

## 2023-05-17 ENCOUNTER — TRANSFERRED RECORDS (OUTPATIENT)
Dept: HEALTH INFORMATION MANAGEMENT | Facility: CLINIC | Age: 23
End: 2023-05-17
Payer: COMMERCIAL

## 2023-05-17 ENCOUNTER — LAB REQUISITION (OUTPATIENT)
Dept: LAB | Facility: CLINIC | Age: 23
End: 2023-05-17

## 2023-05-17 DIAGNOSIS — Z3A.28 28 WEEKS GESTATION OF PREGNANCY: ICD-10-CM

## 2023-05-17 DIAGNOSIS — R39.9 UNSPECIFIED SYMPTOMS AND SIGNS INVOLVING THE GENITOURINARY SYSTEM: ICD-10-CM

## 2023-05-17 PROCEDURE — 86780 TREPONEMA PALLIDUM: CPT | Performed by: NURSE PRACTITIONER

## 2023-05-17 PROCEDURE — 87086 URINE CULTURE/COLONY COUNT: CPT | Performed by: NURSE PRACTITIONER

## 2023-05-18 LAB — T PALLIDUM AB SER QL: NONREACTIVE

## 2023-05-19 LAB — BACTERIA UR CULT: NORMAL

## 2023-05-22 ENCOUNTER — MEDICAL CORRESPONDENCE (OUTPATIENT)
Dept: HEALTH INFORMATION MANAGEMENT | Facility: CLINIC | Age: 23
End: 2023-05-22
Payer: COMMERCIAL

## 2023-05-23 ENCOUNTER — MEDICAL CORRESPONDENCE (OUTPATIENT)
Dept: HEALTH INFORMATION MANAGEMENT | Facility: CLINIC | Age: 23
End: 2023-05-23
Payer: COMMERCIAL

## 2023-05-24 ENCOUNTER — TELEPHONE (OUTPATIENT)
Dept: MULTI SPECIALTY CLINIC | Facility: CLINIC | Age: 23
End: 2023-05-24
Payer: COMMERCIAL

## 2023-05-24 NOTE — TELEPHONE ENCOUNTER
External referral from [facility]   Referring: Dr. Sejal Mitchell  Dx: New DX, Abnormal glucose tolerance test during pregnancy    Referral and records received on 05/23

## 2023-05-28 ENCOUNTER — HOSPITAL ENCOUNTER (EMERGENCY)
Facility: HOSPITAL | Age: 23
Discharge: HOME | End: 2023-05-29
Attending: EMERGENCY MEDICINE
Payer: COMMERCIAL

## 2023-05-28 ENCOUNTER — HOSPITAL ENCOUNTER (EMERGENCY)
Facility: HOSPITAL | Age: 23
End: 2023-05-28
Payer: COMMERCIAL

## 2023-05-28 ENCOUNTER — HOSPITAL ENCOUNTER (OUTPATIENT)
Facility: HOSPITAL | Age: 23
Discharge: STILL ADMITTED OR ADMINISTRATIVE | End: 2023-05-28
Attending: OBSTETRICS & GYNECOLOGY | Admitting: OBSTETRICS & GYNECOLOGY
Payer: COMMERCIAL

## 2023-05-28 VITALS
HEIGHT: 63 IN | OXYGEN SATURATION: 98 % | SYSTOLIC BLOOD PRESSURE: 122 MMHG | RESPIRATION RATE: 18 BRPM | WEIGHT: 194 LBS | BODY MASS INDEX: 34.38 KG/M2 | DIASTOLIC BLOOD PRESSURE: 76 MMHG | HEART RATE: 98 BPM | TEMPERATURE: 98.1 F

## 2023-05-28 DIAGNOSIS — R51.9 ACUTE NONINTRACTABLE HEADACHE, UNSPECIFIED HEADACHE TYPE: ICD-10-CM

## 2023-05-28 DIAGNOSIS — N39.0 ACUTE URINARY TRACT INFECTION: Primary | ICD-10-CM

## 2023-05-28 DIAGNOSIS — R42 DIZZINESS: ICD-10-CM

## 2023-05-28 PROBLEM — Z36.89 ENCOUNTER FOR TRIAGE IN PREGNANT PATIENT: Status: ACTIVE | Noted: 2023-05-28

## 2023-05-28 LAB
ALBUMIN UR-MCNC: NEGATIVE MG/DL
APPEARANCE UR: ABNORMAL
BACTERIA #/AREA URNS HPF: ABNORMAL /HPF
BILIRUB UR QL STRIP: NEGATIVE
COLOR UR AUTO: COLORLESS
GLUCOSE UR STRIP-MCNC: NEGATIVE MG/DL
HGB UR QL STRIP: NEGATIVE
KETONES UR STRIP-MCNC: 10 MG/DL
LEUKOCYTE ESTERASE UR QL STRIP: ABNORMAL
NITRATE UR QL: NEGATIVE
PH UR STRIP: 7 [PH] (ref 5–7)
RBC URINE: 1 /HPF
SP GR UR STRIP: 1.01 (ref 1–1.03)
SQUAMOUS EPITHELIAL: 19 /HPF
UROBILINOGEN UR STRIP-MCNC: <2 MG/DL
WBC URINE: 6 /HPF

## 2023-05-28 PROCEDURE — 85027 COMPLETE CBC AUTOMATED: CPT | Performed by: EMERGENCY MEDICINE

## 2023-05-28 PROCEDURE — 81001 URINALYSIS AUTO W/SCOPE: CPT | Performed by: OBSTETRICS & GYNECOLOGY

## 2023-05-28 PROCEDURE — 250N000011 HC RX IP 250 OP 636: Performed by: OBSTETRICS & GYNECOLOGY

## 2023-05-28 PROCEDURE — 82310 ASSAY OF CALCIUM: CPT | Performed by: EMERGENCY MEDICINE

## 2023-05-28 PROCEDURE — 250N000013 HC RX MED GY IP 250 OP 250 PS 637: Performed by: OBSTETRICS & GYNECOLOGY

## 2023-05-28 PROCEDURE — 83735 ASSAY OF MAGNESIUM: CPT | Performed by: EMERGENCY MEDICINE

## 2023-05-28 PROCEDURE — G0463 HOSPITAL OUTPT CLINIC VISIT: HCPCS

## 2023-05-28 PROCEDURE — 36415 COLL VENOUS BLD VENIPUNCTURE: CPT | Performed by: EMERGENCY MEDICINE

## 2023-05-28 PROCEDURE — 87086 URINE CULTURE/COLONY COUNT: CPT | Performed by: OBSTETRICS & GYNECOLOGY

## 2023-05-28 PROCEDURE — 99285 EMERGENCY DEPT VISIT HI MDM: CPT | Mod: 25

## 2023-05-28 RX ORDER — DIPHENHYDRAMINE HCL 25 MG
25 CAPSULE ORAL ONCE
Status: COMPLETED | OUTPATIENT
Start: 2023-05-28 | End: 2023-05-28

## 2023-05-28 RX ORDER — LIDOCAINE 40 MG/G
CREAM TOPICAL
Status: DISCONTINUED | OUTPATIENT
Start: 2023-05-28 | End: 2023-05-28 | Stop reason: HOSPADM

## 2023-05-28 RX ORDER — NITROFURANTOIN 25; 75 MG/1; MG/1
100 CAPSULE ORAL 2 TIMES DAILY
Qty: 10 CAPSULE | Refills: 0 | Status: SHIPPED | OUTPATIENT
Start: 2023-05-28 | End: 2023-06-02

## 2023-05-28 RX ORDER — PRENATAL VIT/IRON FUM/FOLIC AC 27MG-0.8MG
1 TABLET ORAL DAILY
COMMUNITY
End: 2023-07-19

## 2023-05-28 RX ORDER — MECLIZINE HYDROCHLORIDE 25 MG/1
25 TABLET ORAL ONCE
Status: COMPLETED | OUTPATIENT
Start: 2023-05-28 | End: 2023-05-28

## 2023-05-28 RX ORDER — ONDANSETRON 4 MG/1
4 TABLET, FILM COATED ORAL ONCE
Status: COMPLETED | OUTPATIENT
Start: 2023-05-28 | End: 2023-05-28

## 2023-05-28 RX ORDER — BUTALBITAL, ACETAMINOPHEN AND CAFFEINE 50; 325; 40 MG/1; MG/1; MG/1
2 TABLET ORAL ONCE
Status: COMPLETED | OUTPATIENT
Start: 2023-05-28 | End: 2023-05-28

## 2023-05-28 RX ADMIN — BUTALBITAL, ACETAMINOPHEN, AND CAFFEINE 2 TABLET: 50; 325; 40 TABLET ORAL at 19:03

## 2023-05-28 RX ADMIN — ONDANSETRON HYDROCHLORIDE 4 MG: 4 TABLET, FILM COATED ORAL at 19:02

## 2023-05-28 RX ADMIN — DIPHENHYDRAMINE HYDROCHLORIDE 25 MG: 25 CAPSULE ORAL at 19:03

## 2023-05-28 RX ADMIN — MECLIZINE HYDROCHLORIDE 25 MG: 25 TABLET ORAL at 20:30

## 2023-05-28 ASSESSMENT — ACTIVITIES OF DAILY LIVING (ADL)
ADLS_ACUITY_SCORE: 31
ADLS_ACUITY_SCORE: 35
ADLS_ACUITY_SCORE: 31

## 2023-05-28 NOTE — Clinical Note
Holly Harrell was seen and treated in our emergency department on 5/28/2023.  She may return to work on 05/30/2023.       If you have any questions or concerns, please don't hesitate to call.      Nanette Yancey MD

## 2023-05-29 VITALS
OXYGEN SATURATION: 98 % | SYSTOLIC BLOOD PRESSURE: 105 MMHG | HEART RATE: 77 BPM | DIASTOLIC BLOOD PRESSURE: 59 MMHG | RESPIRATION RATE: 20 BRPM

## 2023-05-29 LAB
ANION GAP SERPL CALCULATED.3IONS-SCNC: 14 MMOL/L (ref 7–15)
BUN SERPL-MCNC: 6.8 MG/DL (ref 6–20)
CALCIUM SERPL-MCNC: 9 MG/DL (ref 8.6–10)
CHLORIDE SERPL-SCNC: 106 MMOL/L (ref 98–107)
CREAT SERPL-MCNC: 0.41 MG/DL (ref 0.51–0.95)
DEPRECATED HCO3 PLAS-SCNC: 20 MMOL/L (ref 22–29)
ERYTHROCYTE [DISTWIDTH] IN BLOOD BY AUTOMATED COUNT: 13 % (ref 10–15)
GFR SERPL CREATININE-BSD FRML MDRD: >90 ML/MIN/1.73M2
GLUCOSE SERPL-MCNC: 87 MG/DL (ref 70–99)
HCT VFR BLD AUTO: 36.7 % (ref 35–47)
HGB BLD-MCNC: 12.3 G/DL (ref 11.7–15.7)
HOLD SPECIMEN: NORMAL
HOLD SPECIMEN: NORMAL
MAGNESIUM SERPL-MCNC: 1.9 MG/DL (ref 1.7–2.3)
MCH RBC QN AUTO: 31.5 PG (ref 26.5–33)
MCHC RBC AUTO-ENTMCNC: 33.5 G/DL (ref 31.5–36.5)
MCV RBC AUTO: 94 FL (ref 78–100)
PLATELET # BLD AUTO: 208 10E3/UL (ref 150–450)
POTASSIUM SERPL-SCNC: 3.6 MMOL/L (ref 3.4–5.3)
RBC # BLD AUTO: 3.91 10E6/UL (ref 3.8–5.2)
SODIUM SERPL-SCNC: 140 MMOL/L (ref 136–145)
WBC # BLD AUTO: 12 10E3/UL (ref 4–11)

## 2023-05-29 PROCEDURE — 93005 ELECTROCARDIOGRAM TRACING: CPT | Performed by: EMERGENCY MEDICINE

## 2023-05-29 RX ORDER — MECLIZINE HYDROCHLORIDE 25 MG/1
25 TABLET ORAL 3 TIMES DAILY PRN
Qty: 30 TABLET | Refills: 0 | Status: SHIPPED | OUTPATIENT
Start: 2023-05-29 | End: 2023-06-12

## 2023-05-29 ASSESSMENT — ACTIVITIES OF DAILY LIVING (ADL)
ADLS_ACUITY_SCORE: 35
ADLS_ACUITY_SCORE: 35

## 2023-05-29 NOTE — PROGRESS NOTES
Discharge instructions from OB standpoint reviewed with patient, plan to transfer patient to ER for further evaluation.      Report called to ER nurse, awaiting  to come prior to transfer per patient request.

## 2023-05-29 NOTE — DISCHARGE INSTRUCTIONS
You were seen in the Emergency Department today for dizziness and headache.    For your headache, would recommend you use Tylenol.  For the dizziness, I am sending with a prescription for meclizine which is one of the medicines that you got upstairs.      Please return to the ER if you experience worsening or uncontrolled pain, inability to keep food or fluids down, falls, weakness in 1 part of your body, and/or for any other new or concerning symptoms, otherwise please follow up with OB team for recheck.     Below is some information you might find useful.     Thank you for choosing [facility]. It was a pleasure taking care of you today!  - Dr. Nanette Yancey

## 2023-05-29 NOTE — ED TRIAGE NOTES
The patient arrives in the er after being cleared in OB. She reports that she started feeling dizzy at 1400 today and came into the er. She was taken up to ob. She also had a headache. She was not hypertensive. She has been given phenergan, benadryl, and zofran. Fetal heart rate normal per OB rn report.

## 2023-05-29 NOTE — DISCHARGE INSTRUCTIONS
Discharge Instruction for Undelivered Patients      You were seen for:  Maternal Assessment for Headache and dizziness  We Consulted: Dr Mitchell   You had (Test or Medicine):Urinalysis and Urine culture     Diet:   Drink 8 to 12 glasses of liquids (milk, juice, water) every day.  You may eat meals and snacks.     Activity:  Count fetal kicks everyday (see handout)  Call your doctor or nurse midwife if your baby is moving less than usual.     Call your provider if you notice:  Swelling in your face or increased swelling in your hands or legs.  Headaches that are not relieved by Tylenol (acetaminophen).  Changes in your vision (blurring: seeing spots or stars.)  Nausea (sick to your stomach) and vomiting (throwing up).   Weight gain of 5 pounds or more per week.  Heartburn that doesn't go away.  Signs of bladder infection: pain when you urinate (use the toilet), need to go more often and more urgently.  The bag of shoemaker (rupture of membranes) breaks, or you notice leaking in your underwear.  Bright red blood in your underwear.  Abdominal (lower belly) or stomach pain.  For first baby: Contractions (tightening) less than 5 minutes apart for one hour or more.  Second (plus) baby: Contractions (tightening) less than 10 minutes apart and getting stronger.  *If less than 34 weeks: Contractions (tightening) more than 6 times in one hour.  Increase or change in vaginal discharge (note the color and amount)    Treat for a UTI with medication sent to your pharmacy.  Please take as ordered.      Follow-up:  As scheduled in the clinic.

## 2023-05-29 NOTE — PROGRESS NOTES
Holly,  presents to the hospital with complaints of dizziness that started about 1430 and since has turned into a terrible headache she now rates 10/10.  She is tearful and closes her eyes and hold her hands over her eyes as she is very sensitive to the lights.      Placed into triage.  Vitals all stable and WNL.  Reflexes normal and no clonus.  No epigastric pain, no blurred vision, but she feels as though the room is spinning.      Dr Mitchell called and updated of patient arrival and status.  Orders obtained and reviewed plan of care.

## 2023-05-29 NOTE — ED PROVIDER NOTES
EMERGENCY DEPARTMENT ENCOUNTER      NAME: Holly Harrell  YOB: 2000  MRN: [id number]    FINAL IMPRESSION  1. Dizziness    2. Acute nonintractable headache, unspecified headache type        MEDICAL DECISION MAKING   Pertinent Labs & Imaging studies reviewed. (See chart for details)    Holly Harrell is a 23 year old female who presents for evaluation of dizziness and nausea.  Patient is 29 weeks 5 days pregnant and following with OB/GYN.  Records reviewed.  She was seen in L&D today for evaluation of dizziness and headache and had reassuring fetal heart tones.  She was diagnosed with a UTI.  Patient was given Benadryl, Zofran, Fioricet, and Antivert with some improvement in headache but not dizziness.  Providers attempted [place]-Hallpike maneuver but patient did not tolerate this.  She was sent to the ER for further evaluation including MRI.  Patient reports onset of symptoms around 230 this afternoon starting with a headache followed by dizziness.  She reports that the symptoms briefly improved then worsened again to the point that she decided to come in for evaluation.  She states that the symptoms are exacerbated when she attempts to move around or open her eyes.  She has had some associated nausea without vomiting.  She denies focal neuro symptoms, falls, fever, or any other recent signs and symptoms of infection or illness.  She does not have a history of vertigo or dizziness.  She did not experience any similar symptoms during previous pregnancy or at baseline.  Remainder of history and exam, as below.     I considered a broad differential diagnosis including, but not limited to BPPV, vestibular neuritis, labyrinthitis, migraine, arrhythmia, orthostatic hypotension, CVA/TIA, venous thrombosis, carotid or vertebral artery dissection, vertebro-basilar insufficiency, mass/tumor, electrolyte derangement, anemia, occult infection. Meniere's and labyrinthitis less likely without hearing loss/tinnitus. No clear  focal neuro symptoms to suggest acute CVA or TIA but cannot definitively rule this out on history and exam. History and exam not clearly consistent with peripheral process and there is no appreciable nystagmus or provocable/positional symptoms. Discussed options for workup and management with patient. We have agreed on plan for MR/MRA head and neck and MRV brain, basic labs, ECG.  Patient declined offer for additional antiemetic/analgesic but will update me if she changes her mind.    EKG without evidence of ischemia or arrhythmia.  BMP reassuring. No evidence of JULISSA, acidosis, or significant electrolyte derangement. CBC reassuring. No evidence of leukocytosis to suggest systemic infectious/inflammatory process. No acute anemia. PLTs wnl.  MR/MRV of brain showed no evidence of acute intracranial process to explain symptoms.  MRA of neck was also negative for any dissection or other acute findings related to dizziness or headache.    I rechecked the patient multiple times.  She fell asleep shortly after initial evaluation and did not have any recurrence of headache.  She was also able to safely ambulate to the bathroom multiple times, tolerate water, and rest.  I updated her with results of scans and labs and we discussed potential etiology of symptoms.  Explained that at this point, it does seem as though her symptoms are peripheral in nature and hopefully, will improve with symptomatic cares.  She was not interested in staying in the hospital and given she is tolerating p.o., is hemodynamically stable, and has no acute findings on work-up today, I do believe it is very reasonable to discharge her with a prescription for meclizine which was helpful upstairs.  We will also have her take Tylenol for recurrent headaches and follow-up closely with OB/GYN/PCP.     Strict return precautions and follow up recommendations were discussed and all questions were answered. Patient and her significant other endorsed  understanding and were in agreement with plan.        Medical Decision Making    History:    Supplemental history from: N/A    External Record(s) reviewed: Documented in chart, if applicable.    Work Up:    Chart documentation includes differential considered and any EKGs or imaging independently interpreted by provider, where specified.    In additional to work up documented, I considered the following work up: Documented in chart, if applicable.    External consultation:    Discussion of management with another provider: Documented in chart, if applicable    Complicating factors:    Care impacted by chronic illness: N/A    Care affected by social determinants of health: N/A    Disposition considerations: Discharge. I prescribed additional prescription strength medication(s) as charted. I considered admission, but discharged the patient after share decision making conversation.          ED COURSE  11:40 PM I met the patient and performed my initial interview and exam.   3:34 AM I rechecked the patient.  3:41 AM I rechecked the patient. She feels better and ready to go home. We discussed the plan for discharge and the patient is agreeable. Reviewed supportive cares, symptomatic treatment, outpatient follow up, and reasons to return to the Emergency Department. All questions and concerns were addressed. Patient to be discharged by ED RN.         MEDICATIONS GIVEN IN THE ED  Medications - No data to display    NEW PRESCRIPTIONS STARTED AT TODAY'S VISIT  Discharge Medication List as of 5/29/2023  3:47 AM      START taking these medications    Details   meclizine (ANTIVERT) 25 MG tablet Take 1 tablet (25 mg) by mouth 3 times daily as needed for dizziness, Disp-30 tablet, R-0, Local Print                =================================================================    Chief Complaint   Patient presents with     Dizziness         HPI:    Patient information was obtained from: patient     Use of : N/A      Holly Harrell is a 23 year old female who presents to the ED for evaluation of dizziness.    Per chart review, patient was seen earlier on 23 by OB/GYN at [facility] for dizziness and headache She had reassuring fetal heart tones.  She was diagnosed with a UTI.  Patient was given Benadryl, Zofran, Fioricet, and Antivert with some improvement in headache but not dizziness.  Providers attempted [place]-Hallpike maneuver but patient did not tolerate this.  She was sent to the ER for further evaluation including MRI.     The patient reports dizziness ongoing since ~1430 23 (10 hours ago). Felt fine prior to this. She describes the dizziness as room spinning. It is constant and provoked with movement of her eyes or head. She does endorses some improvement in her dizziness since getting medications up on labor and delivery floor earlier tonight. She otherwise denies any palliating factors. Patient had a headache and some nausea earlier but none at present. Patient otherwise denies any numbness, tingling, weakness, abdominal pain or cramping, tinnitus, or vision changes. She denies having any urinary symptoms despite being diagnosed with UTI earlier. Patient denies any history of symptoms like this. Patient is currently  at ~30 weeks gestation. She denies having any dizziness with her prior pregnancy.       RELEVANT HISTORY, MEDICATIONS, & ALLERGIES   Past medical history, surgical history, family history, medications, and allergies reviewed and pertinent noted in HPI.    REVIEW OF SYSTEMS:  A complete review of systems was performed with pertinent positives and negatives noted in the HPI. All other systems negative.     PHYSICAL EXAM:    Vitals: /59   Pulse 77   Resp 20   LMP 10/05/2022 (Within Days)   SpO2 98%    General: Alert and interactive.  Lying supine on bed with eyes closed.  HENT: Atraumatic. Full AROM of neck. Conjunctiva clear.  No nystagmus.  EOMs intact.  Cardiovascular: Regular rate and  rhythm.   Chest/Pulmonary: Normal work of breathing. Speaking in complete sentences. Lungs CTAB. No chest wall tenderness or deformities.  Abdomen: Soft, gravid. Nontender without guarding or rebound.  Extremities: Normal AROM of all major joints.  Skin: Warm and dry. Normal skin color.   Neuro: Speech clear. CNs grossly intact. Moves all extremities spontaneously.  Normal finger-nose-finger bilaterally.  No ataxia.  Strength 5/5 bilateral handgrip.  Psych: Normal affect/mood, cooperative, memory appropriate.    LAB  Labs Ordered and Resulted from Time of ED Arrival to Time of ED Departure   BASIC METABOLIC PANEL - Abnormal       Result Value    Sodium 140      Potassium 3.6      Chloride 106      Carbon Dioxide (CO2) 20 (*)     Anion Gap 14      Urea Nitrogen 6.8      Creatinine 0.41 (*)     Calcium 9.0      Glucose 87      GFR Estimate >90     CBC WITH PLATELETS - Abnormal    WBC Count 12.0 (*)     RBC Count 3.91      Hemoglobin 12.3      Hematocrit 36.7      MCV 94      MCH 31.5      MCHC 33.5      RDW 13.0      Platelet Count 208     MAGNESIUM - Normal    Magnesium 1.9         RADIOLOGY  MRA Neck (Carotids) wo Contrast   Final Result   IMPRESSION:   1.  Normal neck MRA.      MRV Brain wo Contrast   Final Result   IMPRESSION:   HEAD MRI:    1. No acute intracranial abnormality.   2. A couple small nonspecific T2 hyperintensities are seen within the right frontal subcortical white matter. Findings can be seen in prior inflammation, other nondescript areas of gliosis or occasionally patients with migraine headaches.       HEAD MRV:    1.  No dural venous sinus thrombosis or significant stenosis.      MR Brain w/o Contrast   Final Result   IMPRESSION:   HEAD MRI:    1. No acute intracranial abnormality.   2. A couple small nonspecific T2 hyperintensities are seen within the right frontal subcortical white matter. Findings can be seen in prior inflammation, other nondescript areas of gliosis or occasionally patients  with migraine headaches.       HEAD MRV:    1.  No dural venous sinus thrombosis or significant stenosis.          EKG  Performed at: 00:19 5/29/23.   Impression: Normal sinus rhythm.  No acute ischemic changes.  Normal intervals.  No previous for comparison.  Rate: 85 BPM.  Rhythm: Sinus.  QRS Interval: 82 ms.  QTc Interval: 466 ms.  Comparison: No prior ECG available for comparison.    All laboratory and imaging results and EKG's were personally reviewed and interpreted by myself prior to disposition decision.         I, Graciela Clarke, am serving as a scribe to document services personally performed by Dr. Nanette Yancey based on my observation and the provider's statements to me. I, Nanette Yancey MD attest that Graciela Clarke is acting in a scribe capacity, has observed my performance of the services and has documented them in accordance with my direction.    Nanette Yancey M.D.  Emergency Medicine  [facility] EMERGENCY DEPARTMENT  [address] [place] [id number]  [phone]  Dept: [phone]     Nanette Yancey MD  05/29/23 0402

## 2023-05-29 NOTE — ED NOTES
PT patient reports that right now her biggest complaint is the dizziness. She is able to speak in complete sentences. She was able to walk to the restroom with an assist of 1. She is able to follow commands.

## 2023-05-29 NOTE — PROGRESS NOTES
Patient reports improvement in headache but still sensitive to light and very dizzy.  Dr Mitchell in to see the patient.  Attempted repositioning to see if she could get any relief from dizziness with position changes.  Additional medications given for dizziness.  Patient up to bathroom.  Urine results reviewed with Dr Mitchell and with patient.  Patient resting currently, will reevaluate for improvement after medication.

## 2023-05-29 NOTE — H&P
"[facility]    History and Physical  - TRIAGE      Date of Admission:  2023    History of Present Illness   Holly Harrell is a 23 year old female  29w5d  Estimated Date of Delivery: Aug 8, 2023 presents to Labor and Delivery Triage with dizziness and headache.  She has never experienced this before.  She is senstitive to light and sound.  She admits to \"the room spinning\" and mild nausea.  She hasn't taken any medications to help.      She reports being treated for an infection 2 weeks ago - reported at UTI by the patient.  Upon chart review, Metronidazole was prescribed on 23 for bacterial vaginosis. Today, she denies dysuria.         OB COMPLICATIONS:  Prenatal course was essentially uncomplicated      Past Medical History    Past Medical History:   Diagnosis Date     Pain in both lower extremities 2018       Past Surgical History   History reviewed. No pertinent surgical history.    OB History    Para Term  AB Living   2 1 1 0 0 1   SAB IAB Ectopic Multiple Live Births   0 0 0 0 1      # Outcome Date GA Lbr Simon/2nd Weight Sex Delivery Anes PTL Lv   2 Current            1 Term 19 37w4d 04:10 / 02:09 3.43 kg (7 lb 9 oz) M Vag-Spont Local, Nitrous, IV N HERACLIO      Birth Comments: NCB, laceration, well healed, bottle only      Name: Shemar West      Apgar1: 9  Apgar5: 9      Social History   Social History     Tobacco Use     Smoking status: Never     Smokeless tobacco: Never   Substance Use Topics     Alcohol use: Never     Drug use: Never      Family History   Family History   Problem Relation Age of Onset     No Known Problems Mother      No Known Problems Father      No Known Problems Maternal Grandmother      No Known Problems Maternal Grandfather      No Known Problems Paternal Grandmother      No Known Problems Paternal Grandfather      No Known Problems Brother      No Known Problems Brother      No Known Problems Brother      No Known Problems " Brother      No Known Problems Brother      No Known Problems Sister      No Known Problems Sister      No Known Problems Sister      No Known Problems Son         Prior to Admission Medications   Prior to Admission Medications   Prescriptions Last Dose Informant Patient Reported? Taking?   Prenatal Vit-Fe Fumarate-FA (PRENATAL MULTIVITAMIN W/IRON) 27-0.8 MG tablet 5/27/2023  Yes Yes   Sig: Take 1 tablet by mouth daily      Facility-Administered Medications: None     Allergies   No Known Allergies    Physical Exam   Vital Signs with Ranges  Temp:  [98.1  F (36.7  C)] 98.1  F (36.7  C)  Pulse:  [98] 98  Resp:  [18] 18  BP: (122)/(76) 122/76  SpO2:  [98 %] 98 %    Gen: no acute distress, resting comfortably   CV: acyanotic   Heart: regular rate and rhythm   Pulm: unlabored respirations, clear to ausculation bilaterally    Abd: gravid, soft, nontender   Extremities: soft, nontender     Recent Labs   Lab Test 01/05/23  1225   AS Negative     Recent Labs   Lab Test 01/05/23  1225   HEPBANG Nonreactive   HIAGAB Nonreactive   RUQIGG Positive       Fetal Heart Tones: 125 baseline, moderate variablility, + accels, no decels and Category I  TOCO:   none      Assessment & Plan   Holly Harrell is a 23 year old female who presents with dizziness and headache  IUP @ 29w5d .  NST reactive.  Category  I  Vertigo?  Acute UTI    PLAN:   She was given: Benadryl 25 mg, Zofran ODT 4 mg, Fioricet x2 tablets and Antivert 25 mg.  This releived her headache somewhat but she continues to have dizziness. Dix-Hallpike-Epley maneuver was attempted however the patient did not tolerate this.      I discussed the patient case with Dr. Dumont who accepts the patient as a transfer to the ER for continued work up to include MRI imaging.      Incidentally, a UTI is also suspected by UA results.  Macrobid was sent to her pharmacy to have her start upon discharge.      Sejal Mitchell, DO

## 2023-05-30 ENCOUNTER — ALLIED HEALTH/NURSE VISIT (OUTPATIENT)
Dept: EDUCATION SERVICES | Facility: CLINIC | Age: 23
End: 2023-05-30
Payer: COMMERCIAL

## 2023-05-30 ENCOUNTER — APPOINTMENT (OUTPATIENT)
Dept: MRI IMAGING | Facility: HOSPITAL | Age: 23
End: 2023-05-30
Attending: EMERGENCY MEDICINE
Payer: COMMERCIAL

## 2023-05-30 VITALS — BODY MASS INDEX: 34.54 KG/M2 | WEIGHT: 195 LBS

## 2023-05-30 DIAGNOSIS — O24.410 DIET CONTROLLED GESTATIONAL DIABETES MELLITUS (GDM) IN THIRD TRIMESTER: Primary | ICD-10-CM

## 2023-05-30 LAB
ATRIAL RATE - MUSE: 85 BPM
BACTERIA UR CULT: NORMAL
DIASTOLIC BLOOD PRESSURE - MUSE: 54 MMHG
INTERPRETATION ECG - MUSE: NORMAL
P AXIS - MUSE: 53 DEGREES
PR INTERVAL - MUSE: 156 MS
QRS DURATION - MUSE: 82 MS
QT - MUSE: 392 MS
QTC - MUSE: 466 MS
R AXIS - MUSE: 76 DEGREES
SYSTOLIC BLOOD PRESSURE - MUSE: 105 MMHG
T AXIS - MUSE: 38 DEGREES
VENTRICULAR RATE- MUSE: 85 BPM

## 2023-05-30 PROCEDURE — 70547 MR ANGIOGRAPHY NECK W/O DYE: CPT

## 2023-05-30 PROCEDURE — 70551 MRI BRAIN STEM W/O DYE: CPT

## 2023-05-30 PROCEDURE — 70544 MR ANGIOGRAPHY HEAD W/O DYE: CPT

## 2023-05-30 PROCEDURE — G0108 DIAB MANAGE TRN  PER INDIV: HCPCS | Performed by: DIETITIAN, REGISTERED

## 2023-05-30 RX ORDER — LANCETS
100 EACH MISCELLANEOUS 4 TIMES DAILY
Qty: 100 EACH | Refills: 4 | Status: SHIPPED | OUTPATIENT
Start: 2023-05-30

## 2023-05-30 NOTE — LETTER
2023         RE: Holly Harrell  [address]  [facility] [address]        Dear Colleague,    Thank you for referring your patient, Holly Harrell, to the [facility]. Please see a copy of my visit note below.    Diabetes Self-Management Education & Support/ 50 minutes through professional  Cristhian Marroquin     Type of Service: In Person Visit    SUBJECTIVE/OBJECTIVE:  Presents for education related to gestational diabetes.    Accompanied by: Self,   Gestational weeks: 30w  Hospital planned for delivery: [facility] OB Visit Date: 23  Number of previous pregnancies: 1  Had any babies over 9 lbs: No  Previously had Gestational Diabetes: No  Have you ever had thyroid problems or taken thyroid medication?: No  Heart disease, mitral valve prolapse or rheumatic fever?: No  Hypertension : No  High Cholesterol: No  High Triglycerides: No  Do you use tobacco products?: No  Do you drink beer, wine or hard liquor?: No    Cultural Influences/Ethnic Background:  Not  or       Estimated Date of Delivery: Aug 8, 2023    1 hour OGTT  Lab Results   Component Value Date    GLU1 149 (H) 2019         3 hour OGTT    Fasting  No results found for: GTTGF    1 hour  No results found for: GTTG1    2 hour  No results found for: GTTG2    3 hour  No results found for: GTTG3    Lifestyle and Health Behaviors:  Pre-pregnancy weight (lbs): 184  Exercise:: Currently not exercising (she is doing [employer] exercises: 0-7x/week)  Meal planning/habits: None  Meals include: Breakfast, Lunch, Dinner  Breakfast: 9am: or skip rice, vegetables.  Lunch: rice, vegetables  Dinner: rice, one cup or more, vegetables, meat  Snacks: coconut water( 10 grams per one cup fresh),  Other: does not have a good appetite during this pregnancy  Beverages: Water, Other (4oz fresh coconut water, about 5 grams of cho)  Biggest challenges to healthy eating: Other (not a great appetite)  Pre-meera vitamin?:  Yes  Supplements?: No  Experiencing nausea?: No  Experiencing heartburn?: Yes    Healthy Coping:  Emotional response to diabetes: Ready to learn  Stage of change: PREPARATION (Decided to change - considering how)    Current Management:  Taking medications for gestational diabetes?: No    ASSESSMENT:  Initial GDM education for Holly.  She is active with [employer] videos 0-7x/week. Holly consumes 2-3 meals/day, she is hydrating with water and fresh coconut water.     INTERVENTION:  Patient was instructed on Contour Next  meter and was able to provide an accurate return demonstration. Patient's PP blood glucose reading today was 131 mg/dL.    Educational topics covered today:  GDM diagnosis, pathophysiology, Risks and Complications of GDM, Means of controlling GDM, Using a Blood Glucose Monitor, Blood Glucose Goals, Logging and Interpreting Glucose Results, Ketone Testing, When to Call a Diabetes Educator or OB Provider, Healthy Eating During Pregnancy, Counting Carbohydrates, Meal Planning for GDM, and Physical Activity    Educational materials provided today:   Luis Manuel Understanding Gestational Diabetes  GDM Log Book  Sharps Disposal  Care After Delivery  Contour Next  meter kit    Pt verbalized understanding of concepts discussed and recommendations provided today.     PLAN:  Check glucose 4 times daily, before breakfast and 1 hour after each meal.     Check Ketones daily for one week, if negative, reduce testing to once a week.     Physical activity recommended: 10-15 minutes after meals.    Meal plan: 30 carbs at breakfast, 45 carbs at lunch, 45 carbs at supper, 15-30 carbs at 3 snacks a day.  Follow consistent CHO meal plan, eat CHO and protein/fat at all meals/snacks.    Call [phone]/Exit41 message diabetes educator if 3 or more blood sugars are above the goal in 1 week, if ketones are positive, or with questions/concerns.      Time Spent: 55 minutes  Encounter Type: Individual    Any diabetes medication  dose changes were made via the CDE Protocol and Collaborative Practice Agreement with the patient's OB/GYN provider. A copy of this encounter was shared with the provider.

## 2023-05-30 NOTE — PATIENT INSTRUCTIONS
Follow up with diabetes education for blood glucose and ketone review on 6/7/23    Plan to share your glucose and ketone information with diabetes education once a week, unless otherwise directed.     1. Check glucose 3-4 times daily, before breakfast daily and 1 hour after each meal, as recommended.    2. Check ketones daily or once a week after they have been negative for 7 days in a row. If ketones are elevated, let your diabetes educator know and continue to check daily until they are negative for 7 days in a row.    3. Continue with recommended physical activity: 10-15 minutes after eating.    4. Continue to follow recommended meal plan: 30 grams carbs at breakfast, 45 carbs at lunch, 45 carbs at supper, 15-30 carbs at snacks.  Limit rice to one small bowl per meal    5. Follow-up with OB doctor as recommended.    6. Call or MyChart message your diabetes educator if 3 or more blood sugars are above the goal in 1 week or if ketones are elevated (trace or above).       AFTER YOU DELIVER:  - Continue with healthy eating and physical activity to get back to your pre-pregnancy weight.   - Have a follow-up 2-hour Glucose Tolerance Test at your 6-week post-partum check-up.   - Have your fasting blood sugar checked once a year.  - Plan ahead for future pregnancies - eat healthy, keep active, work with your doctor to check for gestational diabetes early on in the pregnancy and check blood sugars as recommended by your doctor.

## 2023-05-30 NOTE — PROGRESS NOTES
Diabetes Self-Management Education & Support/ 50 minutes through professional  Cristhian Marroquin     Type of Service: In Person Visit    SUBJECTIVE/OBJECTIVE:  Presents for education related to gestational diabetes.    Accompanied by: Self,   Gestational weeks: 30w  Hospital planned for delivery: [facility] OB Visit Date: 23  Number of previous pregnancies: 1  Had any babies over 9 lbs: No  Previously had Gestational Diabetes: No  Have you ever had thyroid problems or taken thyroid medication?: No  Heart disease, mitral valve prolapse or rheumatic fever?: No  Hypertension : No  High Cholesterol: No  High Triglycerides: No  Do you use tobacco products?: No  Do you drink beer, wine or hard liquor?: No    Cultural Influences/Ethnic Background:  Not  or       Estimated Date of Delivery: Aug 8, 2023    1 hour OGTT  Lab Results   Component Value Date    GLU1 149 (H) 2019         3 hour OGTT    Fasting  No results found for: GTTGF    1 hour  No results found for: GTTG1    2 hour  No results found for: GTTG2    3 hour  No results found for: GTTG3    Lifestyle and Health Behaviors:  Pre-pregnancy weight (lbs): 184  Exercise:: Currently not exercising (she is doing [employer] exercises: 0-7x/week)  Meal planning/habits: None  Meals include: Breakfast, Lunch, Dinner  Breakfast: 9am: or skip rice, vegetables.  Lunch: rice, vegetables  Dinner: rice, one cup or more, vegetables, meat  Snacks: coconut water( 10 grams per one cup fresh),  Other: does not have a good appetite during this pregnancy  Beverages: Water, Other (4oz fresh coconut water, about 5 grams of cho)  Biggest challenges to healthy eating: Other (not a great appetite)  Pre- vitamin?: Yes  Supplements?: No  Experiencing nausea?: No  Experiencing heartburn?: Yes    Healthy Coping:  Emotional response to diabetes: Ready to learn  Stage of change: PREPARATION (Decided to change - considering how)    Current  Management:  Taking medications for gestational diabetes?: No    ASSESSMENT:  Initial GDM education for Holly.  She is active with [employer] videos 0-7x/week. Holly consumes 2-3 meals/day, she is hydrating with water and fresh coconut water.     INTERVENTION:  Patient was instructed on Contour Next  meter and was able to provide an accurate return demonstration. Patient's PP blood glucose reading today was 131 mg/dL.    Educational topics covered today:  GDM diagnosis, pathophysiology, Risks and Complications of GDM, Means of controlling GDM, Using a Blood Glucose Monitor, Blood Glucose Goals, Logging and Interpreting Glucose Results, Ketone Testing, When to Call a Diabetes Educator or OB Provider, Healthy Eating During Pregnancy, Counting Carbohydrates, Meal Planning for GDM, and Physical Activity    Educational materials provided today:   Luis Manuel Understanding Gestational Diabetes  GDM Log Book  Sharps Disposal  Care After Delivery  Contour Next  meter kit    Pt verbalized understanding of concepts discussed and recommendations provided today.     PLAN:  Check glucose 4 times daily, before breakfast and 1 hour after each meal.     Check Ketones daily for one week, if negative, reduce testing to once a week.     Physical activity recommended: 10-15 minutes after meals.    Meal plan: 30 carbs at breakfast, 45 carbs at lunch, 45 carbs at supper, 15-30 carbs at 3 snacks a day.  Follow consistent CHO meal plan, eat CHO and protein/fat at all meals/snacks.    Call [phone]/[employer] message diabetes educator if 3 or more blood sugars are above the goal in 1 week, if ketones are positive, or with questions/concerns.      Time Spent: 55 minutes  Encounter Type: Individual    Any diabetes medication dose changes were made via the CDE Protocol and Collaborative Practice Agreement with the patient's OB/GYN provider. A copy of this encounter was shared with the provider.

## 2023-06-07 ENCOUNTER — TELEPHONE (OUTPATIENT)
Dept: EDUCATION SERVICES | Facility: CLINIC | Age: 23
End: 2023-06-07

## 2023-06-07 ENCOUNTER — ALLIED HEALTH/NURSE VISIT (OUTPATIENT)
Dept: EDUCATION SERVICES | Facility: CLINIC | Age: 23
End: 2023-06-07
Payer: COMMERCIAL

## 2023-06-07 VITALS — BODY MASS INDEX: 34.61 KG/M2 | WEIGHT: 195.4 LBS

## 2023-06-07 DIAGNOSIS — O24.410 DIET CONTROLLED GESTATIONAL DIABETES MELLITUS (GDM) IN THIRD TRIMESTER: Primary | ICD-10-CM

## 2023-06-07 DIAGNOSIS — O24.414 INSULIN CONTROLLED GESTATIONAL DIABETES MELLITUS (GDM) DURING PREGNANCY, ANTEPARTUM: Primary | ICD-10-CM

## 2023-06-07 PROCEDURE — 97802 MEDICAL NUTRITION INDIV IN: CPT | Performed by: DIETITIAN, REGISTERED

## 2023-06-07 RX ORDER — METRONIDAZOLE 500 MG/1
1 TABLET ORAL
COMMUNITY
Start: 2023-05-17 | End: 2023-07-19

## 2023-06-07 RX ORDER — BLOOD PRESSURE TEST KIT
1 KIT MISCELLANEOUS DAILY
Qty: 100 EACH | Refills: 1 | Status: SHIPPED | OUTPATIENT
Start: 2023-06-07

## 2023-06-07 RX ORDER — INSULIN DETEMIR 100 [IU]/ML
8 INJECTION, SOLUTION SUBCUTANEOUS AT BEDTIME
Qty: 15 ML | Refills: 1 | Status: ON HOLD | OUTPATIENT
Start: 2023-06-07 | End: 2023-07-26

## 2023-06-07 RX ORDER — PEN NEEDLE, DIABETIC 30 GX3/16"
1 NEEDLE, DISPOSABLE MISCELLANEOUS DAILY
Qty: 100 EACH | Refills: 1 | Status: SHIPPED | OUTPATIENT
Start: 2023-06-07

## 2023-06-07 RX ORDER — NORGESTIMATE AND ETHINYL ESTRADIOL 0.25-0.035
1 KIT ORAL
COMMUNITY
Start: 2022-09-14 | End: 2023-06-07

## 2023-06-07 NOTE — PATIENT INSTRUCTIONS
Inject insulin at night before bed: one time per day.  Test urine one time per week in the morning before eating  Test your blood sugar when you wake up before eating and one hour after each of your meals.  Do not have a snack with your rice meal. Wait until you check blood sugar one hour after meals, and then have a small snack.  Include meat or eggs or Chobani yogurt  or a piece of fruit as a snack.   Limit rice to one small bowl per meal: add more vegetables and meat with your meal.  Add in 10-15 minutes of exercise after your meals.     Follow up on 6/14 or with pregnancy clinic the week of 6/12/23.

## 2023-06-07 NOTE — LETTER
6/7/2023         RE: Holly Harrell  [address]  [facility] [place] [address]        Dear Colleague,    Thank you for referring your patient, Holly Harrell, to the [facility]. Please see a copy of my visit note below.    Diabetes and Pregnancy Follow-up/ 44 minutes through professional  # [id number]  Type of Service: In clinic visit    How would patient like to obtain AVS?  printed    Subjective/Objective:    GDM follow up visit for Holly Harrell. Last date of communication was: 5/30/23.    Gestational diabetes is being managed with diet and activity    Taking diabetes medications: no    Estimated Date of Delivery: Aug 8, 2023    Blood Glucose/Ketone Log:    Date Ketones Fasting Post Breakfast Post Lunch Post Supper   5/30    131 125   5/31  106 139 97 143   6/1 n 95  119 141   6/2 n 110  159 137   6/3 n 100  249(wedding, ate a lot) 108   6/4 n 92  152 114   6/5 n 102  128 148   6/6 n 100  149 177   6/7 n 103        Elevated PP explainable due to patient having larger portions of rice with meals, plus snacks with meals of sesame balls and a jellied texture sweet.     Assessment:  Holly reported that she was having a hard time limiting her portions of foods at meals, because she was feeling hungry throughout the day. She has been having larger portions of rice plus snacks at meals and sweet snacks between meals.  Her FBS are elevated requiring her to start on insulin at bedtime. She was instructed in clinic today on how to use an insulin pen, storage, expiration, site rotation and dosing. We also discussed the importance of making dietary changes to lower PP readings plus walking after meals.        Ketones: negative.   Fasting blood glucoses: 25% in target.  After breakfast: 100% in target.( n=1)  After lunch: 50% in target.  After dinner: 50% in target.    Plan/Response:    1. Inject insulin at night before bed: one time per day.  2. Test urine one time per week in the morning before  eating  3. Test your blood sugar when you wake up before eating and one hour after each of your meals.  4. Do not have a snack with your rice meal. Wait until you check blood sugar one hour after meals, and then have a small snack.  5. Include meat or eggs or Chobani yogurt  or a piece of fruit as a snack.   6. Limit rice to one small bowl per meal: add more vegetables and meat with your meal.  7. Add in 10-15 minutes of exercise after your meals.     Follow up on 6/14 or with pregnancy clinic the week of 6/12/23.     Time Spent: 44 minutes    Any diabetes medication dose changes were made via the CDE Protocol and Collaborative Practice Agreement with the patient's OB/GYN provider. A copy of this encounter was shared with the provider.

## 2023-06-07 NOTE — TELEPHONE ENCOUNTER
Please reach out to Holly to schedule an appointment in the pregnancy clinic for the week of 6/12.  I do have an appointment scheduled with Holly on 6/14 if she cannot get into the PC until the following week.    Thank you,    Heidi COREY

## 2023-06-07 NOTE — PROGRESS NOTES
Diabetes and Pregnancy Follow-up/ 44 minutes through professional  # [id number]  Type of Service: In clinic visit    How would patient like to obtain AVS?  printed    Subjective/Objective:    GDM follow up visit for Holly Harrell. Last date of communication was: 5/30/23.    Gestational diabetes is being managed with diet and activity    Taking diabetes medications: no    Estimated Date of Delivery: Aug 8, 2023    Blood Glucose/Ketone Log:    Date Ketones Fasting Post Breakfast Post Lunch Post Supper   5/30    131 125   5/31  106 139 97 143   6/1 n 95  119 141   6/2 n 110  159 137   6/3 n 100  249(wedding, ate a lot) 108   6/4 n 92  152 114   6/5 n 102  128 148   6/6 n 100  149 177   6/7 n 103        Elevated PP explainable due to patient having larger portions of rice with meals, plus snacks with meals of sesame balls and a jellied texture sweet.     Assessment:  Holly reported that she was having a hard time limiting her portions of foods at meals, because she was feeling hungry throughout the day. She has been having larger portions of rice plus snacks at meals and sweet snacks between meals.  Her FBS are elevated requiring her to start on insulin at bedtime. She was instructed in clinic today on how to use an insulin pen, storage, expiration, site rotation and dosing. We also discussed the importance of making dietary changes to lower PP readings plus walking after meals.        Ketones: negative.   Fasting blood glucoses: 25% in target.  After breakfast: 100% in target.( n=1)  After lunch: 50% in target.  After dinner: 50% in target.    Plan/Response:    1. Inject insulin at night before bed: one time per day.  2. Test urine one time per week in the morning before eating  3. Test your blood sugar when you wake up before eating and one hour after each of your meals.  4. Do not have a snack with your rice meal. Wait until you check blood sugar one hour after meals, and then have a small snack.  5. Include meat  or eggs or Chobani yogurt  or a piece of fruit as a snack.   6. Limit rice to one small bowl per meal: add more vegetables and meat with your meal.  7. Add in 10-15 minutes of exercise after your meals.     Follow up on 6/14 or with pregnancy clinic the week of 6/12/23.     Time Spent: 44 minutes    Any diabetes medication dose changes were made via the CDE Protocol and Collaborative Practice Agreement with the patient's OB/GYN provider. A copy of this encounter was shared with the provider.

## 2023-06-08 NOTE — TELEPHONE ENCOUNTER
Forwarding to Heidi Mackey to determine urgency of appointment.    Mary Ann Kumari RD [facility]  [phone]

## 2023-06-14 ENCOUNTER — ALLIED HEALTH/NURSE VISIT (OUTPATIENT)
Dept: EDUCATION SERVICES | Facility: CLINIC | Age: 23
End: 2023-06-14
Payer: COMMERCIAL

## 2023-06-14 VITALS — WEIGHT: 193.5 LBS | BODY MASS INDEX: 34.28 KG/M2

## 2023-06-14 DIAGNOSIS — O24.414 INSULIN CONTROLLED GESTATIONAL DIABETES MELLITUS (GDM) DURING PREGNANCY, ANTEPARTUM: Primary | ICD-10-CM

## 2023-06-14 PROCEDURE — 99207 PR NO CHARGE NURSE ONLY: CPT

## 2023-06-14 PROCEDURE — G0108 DIAB MANAGE TRN  PER INDIV: HCPCS

## 2023-06-14 NOTE — PROGRESS NOTES
Diabetes Self-Management and Care Support    Holly is here alone today for her follow up on Gestational Diabetes.  We are assisted by a professional via telephone today.  She is currently taking insulin to help control her glucose.  She is feeling good.  Stated she is not having swelling in her hands, not having swelling in her feet and her baby is moving good.  She is seeing her OB later today.  Stated she has not been told her baby is big.  She does not remember when her next ultrasound is, does not think it will be today.      OB-Sejal Mitchell  EDC-8.08.23-32 weeks 1 day  Pregnancy number-2  OGTT-100/180/--/--  Previous GDM-No    Current insulin doses  Basal Levemir 8 units  Prandial not at this time    Current blood sugars  6.14  95-forgot insulin last ngiht    6.13  94  140  138    6.12  93  137  115    6.11  91  109  130    6.10  92  152  113    6.09  97  137  122    6.08  93  154  135      Plan-  Continue with current dose.  Increase by 2 units every 2 times fasting glucose is above 95. Call if there is any change in readings before next appointment.  Follow up scheduled for next week with endocrinology       The service provided today was under the supervising provider, Julissa Nicolas, who was available if needed.     Sarah Zhao RN, [facility]  [phone]  In Clinic Tuesday, Wednesday, Thursday  In person   DSMT 30 minutes

## 2023-06-14 NOTE — LETTER
6/14/2023         RE: Holly Harrell  [address]  [facility] [address]        Dear Colleague,    Thank you for referring your patient, Holly Harrell, to the [facility]. Please see a copy of my visit note below.    Diabetes Self-Management and Care Support    Holly is here alone today for her follow up on Gestational Diabetes.  We are assisted by a professional via telephone today.  She is currently taking insulin to help control her glucose.  She is feeling good.  Stated she is not having swelling in her hands, not having swelling in her feet and her baby is moving good.  She is seeing her OB later today.  Stated she has not been told her baby is big.  She does not remember when her next ultrasound is, does not think it will be today.      OB-Sejal Mitchell  EDC-8.08.23-32 weeks 1 day  Pregnancy number-2  OGTT-100/180/--/--  Previous GDM-No    Current insulin doses  Basal Levemir 8 units  Prandial not at this time    Current blood sugars  6.14  95-forgot insulin last ngiht    6.13  94  140  138    6.12  93  137  115    6.11  91  109  130    6.10  92  152  113    6.09  97  137  122    6.08  93  154  135      Plan-  Continue with current dose.  Increase by 2 units every 2 times fasting glucose is above 95. Call if there is any change in readings before next appointment.  Follow up scheduled for next week with endocrinology       The service provided today was under the supervising provider, Julissa Nicolas, who was available if needed.     Sarah Zhao RN, [facility]  [phone]  In Clinic Tuesday, Wednesday, Thursday  In person   DSMT 30 minutes

## 2023-06-21 ENCOUNTER — VIRTUAL VISIT (OUTPATIENT)
Dept: ENDOCRINOLOGY | Facility: CLINIC | Age: 23
End: 2023-06-21
Payer: COMMERCIAL

## 2023-06-21 VITALS
BODY MASS INDEX: 34.45 KG/M2 | DIASTOLIC BLOOD PRESSURE: 60 MMHG | OXYGEN SATURATION: 97 % | SYSTOLIC BLOOD PRESSURE: 98 MMHG | WEIGHT: 194.5 LBS | HEART RATE: 90 BPM

## 2023-06-21 DIAGNOSIS — O24.414 INSULIN CONTROLLED GESTATIONAL DIABETES MELLITUS (GDM) IN THIRD TRIMESTER: Primary | ICD-10-CM

## 2023-06-21 PROBLEM — N93.9 ABNORMAL UTERINE BLEEDING: Status: ACTIVE | Noted: 2021-06-22

## 2023-06-21 PROBLEM — O24.419 WHITE CLASSIFICATION A2 GESTATIONAL DIABETES MELLITUS (GDM): Status: ACTIVE | Noted: 2023-06-21

## 2023-06-21 PROBLEM — E66.9 OBESITY WITH BODY MASS INDEX 30 OR GREATER: Status: ACTIVE | Noted: 2023-06-21

## 2023-06-21 PROCEDURE — 99204 OFFICE O/P NEW MOD 45 MIN: CPT | Performed by: NURSE PRACTITIONER

## 2023-06-21 NOTE — PROGRESS NOTES
"[facility]  ENDOCRINOLOGY    Gestational Diabetes 2023    Holly Harrell, 2000, [id number]          Reason for visit      1. Insulin controlled gestational diabetes mellitus (GDM) in third trimester        HPI     Holly Harrell is a very pleasant 23 year old old female who presents for GESTATIONAL Diabetes Mellitus.  She is currently 33w1d  pregnant . Due date is 23  Diagnosed with GDM based on an OGTT. She hasnot had  GDM in prior pregnancies.   Current carbohydrate intake:consistent with recommendations of 30g-60g-60g.  I have reviewed her blood glucose logs and note that the:  Fasting readings  are:above range on current regimen  Postprandial readings are:in range on current regimen  Current Levemir dose: 8  Current Prandial insulin:   Blood glucose logs/meter brought in and data reviewed and incorporated into decision-making.  Planned delivery at:   [facility]: [place]    Therapy/Interventions in the past:  She has been seen by the Diabetes Educator- and has received instruction on carbohydrate counting and  consistency.  Records from referring provider and other sources have also been reviewed and incorporated into decision-making.      TODAY:    Holly is seen today after starting insulin for GDM. We are assisted by a professional Mami . She provides BG today and continues to have elevations in her FBS. She has also had some pretty significant postprandial elevations as well. She had one that was 249 after \"rice and something sweet\". Suggested that she try some fruit if she was craving sweets. Will increase her insulin dose tonight to 12. We will contact her next week to see what her BG are. She will likely need prandial insulin as well. Baby is moving appropriately and she is having no swelling at present.       Past Medical History       Patient Active Problem List   Diagnosis     Hearing loss     Supervision of normal first pregnancy, antepartum      (normal spontaneous vaginal " delivery)     Encounter for immunization     Encounter for triage in pregnant patient     Abnormal uterine bleeding     Obesity with body mass index 30 or greater     Insulin controlled gestational diabetes mellitus (GDM) in third trimester        Past Surgical History     No past surgical history on file.    Family History     Family History   Problem Relation Age of Onset     No Known Problems Mother      No Known Problems Father      No Known Problems Maternal Grandmother      No Known Problems Maternal Grandfather      No Known Problems Paternal Grandmother      No Known Problems Paternal Grandfather      No Known Problems Brother      No Known Problems Brother      No Known Problems Brother      No Known Problems Brother      No Known Problems Brother      No Known Problems Sister      No Known Problems Sister      No Known Problems Sister      No Known Problems Son        Social History     Social History     Tobacco Use     Smoking status: Never     Smokeless tobacco: Never   Substance Use Topics     Alcohol use: Never     Drug use: Never       Review of Systems     Patient has no polyuria or polydipsia, no chest pain, dyspnea or TIA's, no numbness, tingling or pain in extremities  Remainder negative except as noted in HPI.      Vital Signs     BP 98/60 (BP Location: Right arm, Patient Position: Sitting, Cuff Size: Adult Large)   Pulse 90   Wt 88.2 kg (194 lb 8 oz)   LMP 10/05/2022 (Within Days)   SpO2 97%   BMI 34.45 kg/m    Wt Readings from Last 3 Encounters:   06/21/23 88.2 kg (194 lb 8 oz)   06/14/23 87.8 kg (193 lb 8 oz)   06/07/23 88.6 kg (195 lb 6.4 oz)       Physical Exam     GENERAL: Pleasant, alert, appropriate appearance for age. No acute distress,   HEENT: Normocephalic, atraumatic  NECK: normal in appearance  CHEST/RESPIRATORY: Normal chest wall and respirations.   ABDOMEN: Gravid   SKIN: No melanosis,  ecchymosis,  vitiligo. No acanthosis nigricans  NEURO:  Non-focal, no tremor.  PSYCH:  Alert and oriented -appropriate affect. Orientation, judgement and memory appear intact.  MSK: No joint abnormalities, FROM in all four extremities. No kyphosis    Assessment     1. Insulin controlled gestational diabetes mellitus (GDM) in third trimester        Plan     1. GESTATIONAL DIABETES-  Adjust dose as follows:    -Levemir [id number]   units. Increase by 2 units every 2 days to keep fasting blood glucose below 95mg/dL  -Novolog 0  units with breakfast  -Novolog 0 units with lunch   -Novolog 0 units with dinner  -Increase by 0 units every 2 days to keep 1 hour after meal blood glucose less than 140mg/dL    We reviewed glucose goals of fasting blood glucose <95 mg/dL and 1 hour post prandial blood glucose of <140 mg/dL.    Monitor blood sugar 4 times daily: Fasting  and 1 hour after each meal.  Contact  this clinic [phone] if blood glucose is not within the above-mentioned goals.     We discussed the importance of excellent glycemic control during pregnancy to limit complications such as fetal macrosomia, shoulder dystocia,  hypoglycemia and hyperbilirubinemia.  I have discussed the patient's increased risk of recurrent GDM and/or development of type 2 diabetes later in life.      F/u next week with CDE.        Ale Brown NP  2023      Lab Results     No results found for: HGBA1C, CREATININE, MICROALBUR    No results found for: CHOL, HDL, TRIG, CHOLHDL          Current Medications

## 2023-06-21 NOTE — LETTER
"    2023         RE: Holly Harrell  [address]  [facility] [address]        Dear Colleague,    Thank you for referring your patient, Holly Harrell, to the [facility]. Please see a copy of my visit note below.    [facility]  ENDOCRINOLOGY    Gestational Diabetes 2023    Holly Harrell, 2000, [id number]          Reason for visit      1. Insulin controlled gestational diabetes mellitus (GDM) in third trimester        HPI     Holly Harrell is a very pleasant 23 year old old female who presents for GESTATIONAL Diabetes Mellitus.  She is currently 33w1d  pregnant . Due date is 23  Diagnosed with GDM based on an OGTT. She hasnot had  GDM in prior pregnancies.   Current carbohydrate intake:consistent with recommendations of 30g-60g-60g.  I have reviewed her blood glucose logs and note that the:  Fasting readings  are:above range on current regimen  Postprandial readings are:in range on current regimen  Current Levemir dose: 8  Current Prandial insulin:   Blood glucose logs/meter brought in and data reviewed and incorporated into decision-making.  Planned delivery at:   [facility]    Therapy/Interventions in the past:  She has been seen by the Diabetes Educator- and has received instruction on carbohydrate counting and  consistency.  Records from referring provider and other sources have also been reviewed and incorporated into decision-making.      TODAY:    Holly is seen today after starting insulin for GDM. We are assisted by a professional Mami . She provides BG today and continues to have elevations in her FBS. She has also had some pretty significant postprandial elevations as well. She had one that was 249 after \"rice and something sweet\". Suggested that she try some fruit if she was craving sweets. Will increase her insulin dose tonight to 12. We will contact her next week to see what her BG are. She will likely need prandial insulin as well. Baby is moving " appropriately and she is having no swelling at present.       Past Medical History       Patient Active Problem List   Diagnosis     Hearing loss     Supervision of normal first pregnancy, antepartum      (normal spontaneous vaginal delivery)     Encounter for immunization     Encounter for triage in pregnant patient     Abnormal uterine bleeding     Obesity with body mass index 30 or greater     Insulin controlled gestational diabetes mellitus (GDM) in third trimester        Past Surgical History     No past surgical history on file.    Family History     Family History   Problem Relation Age of Onset     No Known Problems Mother      No Known Problems Father      No Known Problems Maternal Grandmother      No Known Problems Maternal Grandfather      No Known Problems Paternal Grandmother      No Known Problems Paternal Grandfather      No Known Problems Brother      No Known Problems Brother      No Known Problems Brother      No Known Problems Brother      No Known Problems Brother      No Known Problems Sister      No Known Problems Sister      No Known Problems Sister      No Known Problems Son        Social History     Social History     Tobacco Use     Smoking status: Never     Smokeless tobacco: Never   Substance Use Topics     Alcohol use: Never     Drug use: Never       Review of Systems     Patient has no polyuria or polydipsia, no chest pain, dyspnea or TIA's, no numbness, tingling or pain in extremities  Remainder negative except as noted in HPI.      Vital Signs     BP 98/60 (BP Location: Right arm, Patient Position: Sitting, Cuff Size: Adult Large)   Pulse 90   Wt 88.2 kg (194 lb 8 oz)   LMP 10/05/2022 (Within Days)   SpO2 97%   BMI 34.45 kg/m    Wt Readings from Last 3 Encounters:   23 88.2 kg (194 lb 8 oz)   23 87.8 kg (193 lb 8 oz)   23 88.6 kg (195 lb 6.4 oz)       Physical Exam     GENERAL: Pleasant, alert, appropriate appearance for age. No acute distress,   HEENT:  Normocephalic, atraumatic  NECK: normal in appearance  CHEST/RESPIRATORY: Normal chest wall and respirations.   ABDOMEN: Gravid   SKIN: No melanosis,  ecchymosis,  vitiligo. No acanthosis nigricans  NEURO:  Non-focal, no tremor.  PSYCH: Alert and oriented -appropriate affect. Orientation, judgement and memory appear intact.  MSK: No joint abnormalities, FROM in all four extremities. No kyphosis    Assessment     1. Insulin controlled gestational diabetes mellitus (GDM) in third trimester        Plan     1. GESTATIONAL DIABETES-  Adjust dose as follows:    -Levemir [id number]   units. Increase by 2 units every 2 days to keep fasting blood glucose below 95mg/dL  -Novolog 0  units with breakfast  -Novolog 0 units with lunch   -Novolog 0 units with dinner  -Increase by 0 units every 2 days to keep 1 hour after meal blood glucose less than 140mg/dL    We reviewed glucose goals of fasting blood glucose <95 mg/dL and 1 hour post prandial blood glucose of <140 mg/dL.    Monitor blood sugar 4 times daily: Fasting  and 1 hour after each meal.  Contact  this clinic [phone] if blood glucose is not within the above-mentioned goals.     We discussed the importance of excellent glycemic control during pregnancy to limit complications such as fetal macrosomia, shoulder dystocia,  hypoglycemia and hyperbilirubinemia.  I have discussed the patient's increased risk of recurrent GDM and/or development of type 2 diabetes later in life.      F/u next week with CDE.        Ale Brown NP  2023      Lab Results     No results found for: HGBA1C, CREATININE, MICROALBUR    No results found for: CHOL, HDL, TRIG, CHOLHDL          Current Medications             Again, thank you for allowing me to participate in the care of your patient.        Sincerely,        Ale Brown NP

## 2023-07-06 ENCOUNTER — VIRTUAL VISIT (OUTPATIENT)
Dept: ENDOCRINOLOGY | Facility: CLINIC | Age: 23
End: 2023-07-06
Payer: COMMERCIAL

## 2023-07-06 ENCOUNTER — APPOINTMENT (OUTPATIENT)
Dept: EDUCATION SERVICES | Facility: CLINIC | Age: 23
End: 2023-07-06
Payer: COMMERCIAL

## 2023-07-06 VITALS
OXYGEN SATURATION: 97 % | SYSTOLIC BLOOD PRESSURE: 104 MMHG | DIASTOLIC BLOOD PRESSURE: 48 MMHG | HEART RATE: 85 BPM | WEIGHT: 197.7 LBS | BODY MASS INDEX: 35.02 KG/M2

## 2023-07-06 DIAGNOSIS — O24.414 INSULIN CONTROLLED GESTATIONAL DIABETES MELLITUS (GDM) IN THIRD TRIMESTER: Primary | ICD-10-CM

## 2023-07-06 PROCEDURE — 99213 OFFICE O/P EST LOW 20 MIN: CPT | Performed by: NURSE PRACTITIONER

## 2023-07-06 NOTE — Clinical Note
"    2023         RE: Holly Harrell  [address]  [facility] [address]        Dear Colleague,    Thank you for referring your patient, Holly Harrell, to the [facility]. Please see a copy of my visit note below.                                    [facility]  ENDOCRINOLOGY    Gestational Diabetes 2023    Holly Harrell, 2000, [id number]          Reason for visit      No diagnosis found.    HPI     Holly Harrell is a very pleasant 23 year old old female who presents for GESTATIONAL Diabetes Mellitus.  She is currently***  weeks pregnant GP. Due date is***   Diagnosed with GDM based on an OGTT. She has{Blank single:93978::\"had\",\"not had\"}  GDM in prior pregnancies.   Current carbohydrate intake:consistent with recommendations of 30g-60g-60g.  I have reviewed her blood glucose logs and note that the:  Fasting readings  are:{Blank single:14241::\"in range\",\"above range\"} on current regimen  Postprandial readings are:{Blank single:09670::\"in range\",\"above range\"} on current regimen  Current NPH dose:  Current Prandial insulin:   Blood glucose logs/meter brought in and data reviewed and incorporated into decision-making.  Planned delivery at:   OBGYN:     Therapy/Interventions in the past:  She has been seen by the Diabetes Educator- and has received instruction on carbohydrate counting and  consistency.  Records from referring provider and other sources have also been reviewed and incorporated into decision-making.      TODAY:    Past Medical History       Patient Active Problem List   Diagnosis     Hearing loss     Supervision of normal first pregnancy, antepartum      (normal spontaneous vaginal delivery)     Encounter for immunization     Encounter for triage in pregnant patient     Abnormal uterine bleeding     Obesity with body mass index 30 or greater     Insulin controlled gestational diabetes mellitus (GDM) in third trimester        Past Surgical History     No past surgical " history on file.    Family History     Family History   Problem Relation Age of Onset     No Known Problems Mother      No Known Problems Father      No Known Problems Maternal Grandmother      No Known Problems Maternal Grandfather      No Known Problems Paternal Grandmother      No Known Problems Paternal Grandfather      No Known Problems Brother      No Known Problems Brother      No Known Problems Brother      No Known Problems Brother      No Known Problems Brother      No Known Problems Sister      No Known Problems Sister      No Known Problems Sister      No Known Problems Son        Social History     Social History     Tobacco Use     Smoking status: Never     Smokeless tobacco: Never   Substance Use Topics     Alcohol use: Never     Drug use: Never       Review of Systems     Patient has no polyuria or polydipsia, no chest pain, dyspnea or TIA's, no numbness, tingling or pain in extremities  Remainder negative except as noted in HPI.      Vital Signs     /48 (BP Location: Right arm, Patient Position: Sitting, Cuff Size: Adult Large)   Pulse 85   Wt 89.7 kg (197 lb 11.2 oz)   LMP 10/05/2022 (Within Days)   SpO2 97%   BMI 35.02 kg/m    Wt Readings from Last 3 Encounters:   07/06/23 89.7 kg (197 lb 11.2 oz)   06/21/23 88.2 kg (194 lb 8 oz)   06/14/23 87.8 kg (193 lb 8 oz)       Physical Exam     GENERAL: Pleasant, alert, appropriate appearance for age. No acute distress,   HEENT: Normocephalic, atraumatic  NECK: normal in appearance  CHEST/RESPIRATORY: Normal chest wall and respirations.   ABDOMEN: Gravid   SKIN: No melanosis,  ecchymosis,  vitiligo. No acanthosis nigricans  NEURO:  Non-focal, no tremor.  PSYCH: Alert and oriented -appropriate affect. Orientation, judgement and memory appear intact.  MSK: No joint abnormalities, FROM in all four extremities. No kyphosis      Assessment     No diagnosis found.    Plan             Ale Brown NP  7/6/2023          Lab Results     No results  found for: HGBA1C, CREATININE, MICROALBUR    No results found for: CHOL, HDL, TRIG, CHOLHDL          Current Medications               Again, thank you for allowing me to participate in the care of your patient.        Sincerely,        Ale Brown NP

## 2023-07-06 NOTE — PROGRESS NOTES
[facility]  ENDOCRINOLOGY    Gestational Diabetes 2023    Holly Harrell, 2000, [id number]          Reason for visit      1. Insulin controlled gestational diabetes mellitus (GDM) in third trimester        HPI     Holly Harrell is a very pleasant 23 year old old female who presents for GESTATIONAL Diabetes Mellitus.  She is currently 33w1d  pregnant . Due date is 23  Diagnosed with GDM based on an OGTT. She hasnot had  GDM in prior pregnancies.   Current carbohydrate intake:consistent with recommendations of 30g-60g-60g.  I have reviewed her blood glucose logs and note that the:  Fasting readings  are:above range on current regimen  Postprandial readings are:in range on current regimen  Current Levemir dose: 12  Current Prandial insulin:   Blood glucose logs/meter brought in and data reviewed and incorporated into decision-making.  Planned delivery at:   [facility]: [place]    Therapy/Interventions in the past:  She has been seen by the Diabetes Educator- and has received instruction on carbohydrate counting and  consistency.  Records from referring provider and other sources have also been reviewed and incorporated into decision-making.      TODAY:  Holly is seen today after starting insulin for GDM. We are joined by ALLIE Hill.  We are also assisted by a professional Mami , although Holly does quite well with her English. She provides her log book today and her numbers look quite good. She is borderline with some of her meals, but overall she is controlled. We will see her once more prior to delivery.       Past Medical History       Patient Active Problem List   Diagnosis     Hearing loss     Supervision of normal first pregnancy, antepartum      (normal spontaneous vaginal delivery)     Encounter for immunization     Encounter for triage in pregnant patient     Abnormal uterine bleeding     Obesity with body mass index 30 or greater     Insulin controlled gestational diabetes  mellitus (GDM) in third trimester     Perineal laceration        Past Surgical History     History reviewed. No pertinent surgical history.    Family History     Family History   Problem Relation Age of Onset     No Known Problems Mother      No Known Problems Father      No Known Problems Maternal Grandmother      No Known Problems Maternal Grandfather      No Known Problems Paternal Grandmother      No Known Problems Paternal Grandfather      No Known Problems Brother      No Known Problems Brother      No Known Problems Brother      No Known Problems Brother      No Known Problems Brother      No Known Problems Sister      No Known Problems Sister      No Known Problems Sister      No Known Problems Son        Social History     Social History     Tobacco Use     Smoking status: Never     Smokeless tobacco: Never   Substance Use Topics     Alcohol use: Never     Drug use: Never       Review of Systems     Patient has no polyuria or polydipsia, no chest pain, dyspnea or TIA's, no numbness, tingling or pain in extremities  Remainder negative except as noted in HPI.      Vital Signs     /48 (BP Location: Right arm, Patient Position: Sitting, Cuff Size: Adult Large)   Pulse 85   Wt 89.7 kg (197 lb 11.2 oz)   LMP 10/05/2022 (Within Days)   SpO2 97%   BMI 35.02 kg/m    Wt Readings from Last 3 Encounters:   07/06/23 89.7 kg (197 lb 11.2 oz)   06/21/23 88.2 kg (194 lb 8 oz)   06/14/23 87.8 kg (193 lb 8 oz)       Physical Exam     GENERAL: Pleasant, alert, appropriate appearance for age. No acute distress,   HEENT: Normocephalic, atraumatic  NECK: normal in appearance  CHEST/RESPIRATORY: Normal chest wall and respirations.   ABDOMEN: Gravid   SKIN: No melanosis,  ecchymosis,  vitiligo. No acanthosis nigricans  NEURO:  Non-focal, no tremor.  PSYCH: Alert and oriented -appropriate affect. Orientation, judgement and memory appear intact.  MSK: No joint abnormalities, FROM in all four extremities. No  kyphosis      Assessment     1. Insulin controlled gestational diabetes mellitus (GDM) in third trimester        Plan     1. GESTATIONAL DIABETES-  Adjust dose as follows:     -Levemir [id number]   units. Increase by 2 units every 2 days to keep fasting blood glucose below 95mg/dL  -Novolog 0  units with breakfast  -Novolog 0 units with lunch   -Novolog 0 units with dinner  -Increase by 0 units every 2 days to keep 1 hour after meal blood glucose less than 140mg/dL     We reviewed glucose goals of fasting blood glucose <95 mg/dL and 1 hour post prandial blood glucose of <140 mg/dL.    Monitor blood sugar 4 times daily: Fasting  and 1 hour after each meal.  Contact  this clinic [phone] if blood glucose is not within the above-mentioned goals.      We discussed the importance of excellent glycemic control during pregnancy to limit complications such as fetal macrosomia, shoulder dystocia,  hypoglycemia and hyperbilirubinemia.  I have discussed the patient's increased risk of recurrent GDM and/or development of type 2 diabetes later in life.      Follow-up in 2 weeks.     Ale Brown NP  2023          Lab Results     No results found for: HGBA1C, CREATININE, MICROALBUR    No results found for: CHOL, HDL, TRIG, CHOLHDL          Current Medications

## 2023-07-11 ENCOUNTER — LAB REQUISITION (OUTPATIENT)
Dept: LAB | Facility: CLINIC | Age: 23
End: 2023-07-11
Payer: COMMERCIAL

## 2023-07-11 ENCOUNTER — TRANSFERRED RECORDS (OUTPATIENT)
Dept: HEALTH INFORMATION MANAGEMENT | Facility: CLINIC | Age: 23
End: 2023-07-11

## 2023-07-11 DIAGNOSIS — Z34.91 ENCOUNTER FOR SUPERVISION OF NORMAL PREGNANCY, UNSPECIFIED, FIRST TRIMESTER: ICD-10-CM

## 2023-07-11 PROCEDURE — 87653 STREP B DNA AMP PROBE: CPT | Mod: ORL | Performed by: OBSTETRICS & GYNECOLOGY

## 2023-07-12 LAB — GP B STREP DNA SPEC QL NAA+PROBE: NEGATIVE

## 2023-07-19 ENCOUNTER — VIRTUAL VISIT (OUTPATIENT)
Dept: ENDOCRINOLOGY | Facility: CLINIC | Age: 23
End: 2023-07-19
Payer: COMMERCIAL

## 2023-07-19 VITALS
HEIGHT: 63 IN | SYSTOLIC BLOOD PRESSURE: 100 MMHG | HEART RATE: 84 BPM | DIASTOLIC BLOOD PRESSURE: 64 MMHG | WEIGHT: 196.3 LBS | BODY MASS INDEX: 34.78 KG/M2

## 2023-07-19 DIAGNOSIS — O24.414 INSULIN CONTROLLED GESTATIONAL DIABETES MELLITUS (GDM) IN THIRD TRIMESTER: Primary | ICD-10-CM

## 2023-07-19 PROCEDURE — 99214 OFFICE O/P EST MOD 30 MIN: CPT | Performed by: NURSE PRACTITIONER

## 2023-07-19 NOTE — PROGRESS NOTES
[facility]  ENDOCRINOLOGY    Gestational Diabetes 2023    Holly Harrell, 2000, [id number]          Reason for visit      1. Insulin controlled gestational diabetes mellitus (GDM) in third trimester        HPI     Holly Harrell is a very pleasant 23 year old old female who presents for GESTATIONAL Diabetes Mellitus.  She is currently 33w1d  pregnant . Due date is 23  Diagnosed with GDM based on an OGTT. She hasnot had  GDM in prior pregnancies.   Current carbohydrate intake:consistent with recommendations of 30g-60g-60g.  I have reviewed her blood glucose logs and note that the:  Fasting readings  are:above range on current regimen  Postprandial readings are:in range on current regimen  Current Levemir dose: 12 units  Current Prandial insulin:   Blood glucose logs/meter brought in and data reviewed and incorporated into decision-making.  Planned delivery at:   OBGYN: [facility]  Therapy/Interventions in the past:  She has been seen by the Diabetes Educator- and has received instruction on carbohydrate counting and  consistency.  Records from referring provider and other sources have also been reviewed and incorporated into decision-making.      TODAY:    Holly is here today in f/u for GDM. She provides her Logbook today and her numbers look good. FBS are within range. Her prandial readings are as well, however, dinner especially, she is hovering very close to 140.  No scheduled IOD yet, but this will likely happen next week or the first week of August. Baby is moving appropriately and she is having no swelling in her hands or feet. Postpartum instructions given today and all questions answered to her satisfaction. She will call with concerns or problems prior to Delivery.     Past Medical History       Patient Active Problem List   Diagnosis     Hearing loss     Supervision of normal first pregnancy, antepartum      (normal spontaneous vaginal delivery)     Encounter for immunization     Encounter for  "triage in pregnant patient     Abnormal uterine bleeding     Obesity with body mass index 30 or greater     Insulin controlled gestational diabetes mellitus (GDM) in third trimester     Perineal laceration        Past Surgical History     History reviewed. No pertinent surgical history.    Family History     Family History   Problem Relation Age of Onset     No Known Problems Mother      No Known Problems Father      No Known Problems Maternal Grandmother      No Known Problems Maternal Grandfather      No Known Problems Paternal Grandmother      No Known Problems Paternal Grandfather      No Known Problems Brother      No Known Problems Brother      No Known Problems Brother      No Known Problems Brother      No Known Problems Brother      No Known Problems Sister      No Known Problems Sister      No Known Problems Sister      No Known Problems Son        Social History     Social History     Tobacco Use     Smoking status: Never     Smokeless tobacco: Never   Substance Use Topics     Alcohol use: Never     Drug use: Never       Review of Systems     Patient has no polyuria or polydipsia, no chest pain, dyspnea or TIA's, no numbness, tingling or pain in extremities  Remainder negative except as noted in HPI.      Vital Signs     /64 (BP Location: Right arm, Patient Position: Sitting, Cuff Size: Adult Regular)   Pulse 84   Ht 1.6 m (5' 3\")   Wt 89 kg (196 lb 4.8 oz)   LMP 10/05/2022 (Within Days)   BMI 34.77 kg/m    Wt Readings from Last 3 Encounters:   07/19/23 89 kg (196 lb 4.8 oz)   07/06/23 89.7 kg (197 lb 11.2 oz)   06/21/23 88.2 kg (194 lb 8 oz)       Physical Exam     GENERAL: Pleasant, alert, appropriate appearance for age. No acute distress,   HEENT: Normocephalic, atraumatic  NECK: normal in appearance  CHEST/RESPIRATORY: Normal chest wall and respirations.   ABDOMEN: Gravid   SKIN: No melanosis,  ecchymosis,  vitiligo. No acanthosis nigricans  NEURO:  Non-focal, no tremor.  PSYCH: Alert and " oriented -appropriate affect. Orientation, judgement and memory appear intact.  MSK: No joint abnormalities, FROM in all four extremities. No kyphosis    Assessment     1. Insulin controlled gestational diabetes mellitus (GDM) in third trimester        Plan     1. GESTATIONAL DIABETES-  Adjust dose as follows:     -Levemir [id number]   units. Increase by 2 units every 2 days to keep fasting blood glucose below 95mg/dL  -Novolog 0  units with breakfast  -Novolog 0 units with lunch   -Novolog 0 units with dinner  -Increase by 0 units every 2 days to keep 1 hour after meal blood glucose less than 140mg/dL     We reviewed glucose goals of fasting blood glucose <95 mg/dL and 1 hour post prandial blood glucose of <140 mg/dL.    Monitor blood sugar 4 times daily: Fasting  and 1 hour after each meal.  Contact  this clinic [phone] if blood glucose is not within the above-mentioned goals.      We discussed the importance of excellent glycemic control during pregnancy to limit complications such as fetal macrosomia, shoulder dystocia,  hypoglycemia and hyperbilirubinemia.  I have discussed the patient's increased risk of recurrent GDM and/or development of type 2 diabetes later in life.         F/up with A1c 3 months postpartum with PCP.    May be a candidate for metformin postpartum as she has more than 1 risk factor for future Type 2 Diabetes Mellitus.    She will need a screening A1c at least annually, TLC- including carbohydrate control and exercise.    After you deliver the baby, it is suggested to check your blood sugar occasionally (1 - 2 times per week) for 6 weeks.     When you are not pregnant, normal blood sugars are:       Fasting (before eating anything in the morning)  - under 100 mg/dl    2 hours after meals under 140  The American Diabetes Association recommends:     a glucose tolerance test 6 weeks after you deliver the baby.         a hemoglobin Alc test yearly after delivery.  The Alc test is a  2-3 month average blood sugar reading.        Discuss getting these tests with your provider.       After delivery, and your provider has said that it is safe to be active, work toward getting 150 minutes each week of physical activity to decrease your risk of  getting type 2 diabetes.       Maintaining a healthy body weight will also decrease your risk of getting type 2 diabetes.         Ale Brown NP  7/19/2023      Lab Results     No results found for: HGBA1C, CREATININE, MICROALBUR    No results found for: CHOL, HDL, TRIG, CHOLHDL    [unfilled]      Current Medications

## 2023-07-19 NOTE — Clinical Note
"    2023         RE: Holly Harrell  [address]  [place] [address]        Dear Colleague,    Thank you for referring your patient, Holly Harrell, to the [facility]. Please see a copy of my visit note below.    [facility]  ENDOCRINOLOGY    Gestational Diabetes 2023    Holly Harrell, 2000, [id number]          Reason for visit      No diagnosis found.    HPI     Holly Harrell is a very pleasant 23 year old old female who presents for GESTATIONAL Diabetes Mellitus.  She is currently***  weeks pregnant GP. Due date is***   Diagnosed with GDM based on an OGTT. She has{Blank single:23164::\"had\",\"not had\"}  GDM in prior pregnancies.   Current carbohydrate intake:consistent with recommendations of 30g-60g-60g.  I have reviewed her blood glucose logs and note that the:  Fasting readings  are:{Blank single:89083::\"in range\",\"above range\"} on current regimen  Postprandial readings are:{Blank single:03216::\"in range\",\"above range\"} on current regimen  Current NPH dose:  Current Prandial insulin:   Blood glucose logs/meter brought in and data reviewed and incorporated into decision-making.  Planned delivery at:   OBGYN:     Therapy/Interventions in the past:  She has been seen by the Diabetes Educator- and has received instruction on carbohydrate counting and  consistency.  Records from referring provider and other sources have also been reviewed and incorporated into decision-making.      TODAY:    Past Medical History       Patient Active Problem List   Diagnosis     Hearing loss     Supervision of normal first pregnancy, antepartum      (normal spontaneous vaginal delivery)     Encounter for immunization     Encounter for triage in pregnant patient     Abnormal uterine bleeding     Obesity with body mass index 30 or greater     Insulin controlled gestational diabetes mellitus (GDM) in third trimester     Perineal laceration        Past Surgical History     No past surgical history on " "file.    Family History     Family History   Problem Relation Age of Onset     No Known Problems Mother      No Known Problems Father      No Known Problems Maternal Grandmother      No Known Problems Maternal Grandfather      No Known Problems Paternal Grandmother      No Known Problems Paternal Grandfather      No Known Problems Brother      No Known Problems Brother      No Known Problems Brother      No Known Problems Brother      No Known Problems Brother      No Known Problems Sister      No Known Problems Sister      No Known Problems Sister      No Known Problems Son        Social History     Social History     Tobacco Use     Smoking status: Never     Smokeless tobacco: Never   Substance Use Topics     Alcohol use: Never     Drug use: Never       Review of Systems     Patient has {:[id number]}  Remainder negative except as noted in HPI.      Vital Signs     /64 (BP Location: Right arm, Patient Position: Sitting, Cuff Size: Adult Regular)   Pulse 84   Ht 1.6 m (5' 3\")   Wt 89 kg (196 lb 4.8 oz)   LMP 10/05/2022 (Within Days)   BMI 34.77 kg/m    Wt Readings from Last 3 Encounters:   07/19/23 89 kg (196 lb 4.8 oz)   07/06/23 89.7 kg (197 lb 11.2 oz)   06/21/23 88.2 kg (194 lb 8 oz)       Physical Exam     GENERAL: Pleasant, alert, appropriate appearance for age. No acute distress, ***  HEENT: Normocephalic, atraumatic  NECK: Supple, no masses or  lymph nodes.  THYROID: No nodules or enlargement. Non-tender, no bruit  CHEST/RESPIRATORY: Normal chest wall and respirations. Clear to auscultation.  CARDIOVASCULAR: Regular rate and rhythm. S1, S2, no murmur, click, gallop, or rubs.  ABDOMEN: Gravid   LYMPHATIC: No palpable nodes in neck, supraclavicular,  SKIN: No melanosis,  ecchymosis,  vitiligo. No acanthosis nigricans  NEURO:  Non-focal, normal DTRs; no tremor.  PSYCH: Alert and oriented -appropriate affect. Orientation, judgement and memory appear intact.  MSK: No joint abnormalities, FROM in all four " extremities. No kyphosis    Assessment     No diagnosis found.    Plan             Ale Brown NP  7/19/2023      Lab Results     No results found for: HGBA1C, CREATININE, MICROALBUR    No results found for: CHOL, HDL, TRIG, CHOLHDL    [unfilled]      Current Medications           Again, thank you for allowing me to participate in the care of your patient.        Sincerely,        Ale Brown NP

## 2023-07-25 ENCOUNTER — HOSPITAL ENCOUNTER (INPATIENT)
Facility: HOSPITAL | Age: 23
LOS: 1 days | Discharge: HOME | End: 2023-07-26
Attending: FAMILY MEDICINE | Admitting: OBSTETRICS & GYNECOLOGY
Payer: COMMERCIAL

## 2023-07-25 ENCOUNTER — TRANSFERRED RECORDS (OUTPATIENT)
Dept: HEALTH INFORMATION MANAGEMENT | Facility: CLINIC | Age: 23
End: 2023-07-25

## 2023-07-25 PROBLEM — O24.419 GESTATIONAL DIABETES MELLITUS: Status: ACTIVE | Noted: 2023-07-25

## 2023-07-25 LAB
ABO/RH(D): NORMAL
ANTIBODY SCREEN: NEGATIVE
GLUCOSE BLDC GLUCOMTR-MCNC: 122 MG/DL (ref 70–99)
GLUCOSE BLDC GLUCOMTR-MCNC: 122 MG/DL (ref 70–99)
GLUCOSE BLDC GLUCOMTR-MCNC: 141 MG/DL (ref 70–99)
GLUCOSE BLDC GLUCOMTR-MCNC: 144 MG/DL (ref 70–99)
HGB BLD-MCNC: 11 G/DL (ref 11.7–15.7)
HGB BLD-MCNC: 12.5 G/DL (ref 11.7–15.7)
HOLD SPECIMEN: NORMAL
SPECIMEN EXPIRATION DATE: NORMAL

## 2023-07-25 PROCEDURE — 722N000001 HC LABOR CARE VAGINAL DELIVERY SINGLE

## 2023-07-25 PROCEDURE — 36415 COLL VENOUS BLD VENIPUNCTURE: CPT | Performed by: OBSTETRICS & GYNECOLOGY

## 2023-07-25 PROCEDURE — 250N000009 HC RX 250: Performed by: OBSTETRICS & GYNECOLOGY

## 2023-07-25 PROCEDURE — 86901 BLOOD TYPING SEROLOGIC RH(D): CPT | Performed by: OBSTETRICS & GYNECOLOGY

## 2023-07-25 PROCEDURE — 120N000001 HC R&B MED SURG/OB

## 2023-07-25 PROCEDURE — 250N000013 HC RX MED GY IP 250 OP 250 PS 637: Performed by: OBSTETRICS & GYNECOLOGY

## 2023-07-25 PROCEDURE — 0KQM0ZZ REPAIR PERINEUM MUSCLE, OPEN APPROACH: ICD-10-PCS | Performed by: OBSTETRICS & GYNECOLOGY

## 2023-07-25 PROCEDURE — 85018 HEMOGLOBIN: CPT | Performed by: OBSTETRICS & GYNECOLOGY

## 2023-07-25 PROCEDURE — 86850 RBC ANTIBODY SCREEN: CPT | Performed by: OBSTETRICS & GYNECOLOGY

## 2023-07-25 PROCEDURE — 250N000011 HC RX IP 250 OP 636: Mod: JZ | Performed by: OBSTETRICS & GYNECOLOGY

## 2023-07-25 RX ORDER — NALOXONE HYDROCHLORIDE 0.4 MG/ML
0.2 INJECTION, SOLUTION INTRAMUSCULAR; INTRAVENOUS; SUBCUTANEOUS
Status: DISCONTINUED | OUTPATIENT
Start: 2023-07-25 | End: 2023-07-25 | Stop reason: HOSPADM

## 2023-07-25 RX ORDER — METOCLOPRAMIDE 10 MG/1
10 TABLET ORAL EVERY 6 HOURS PRN
Status: DISCONTINUED | OUTPATIENT
Start: 2023-07-25 | End: 2023-07-25 | Stop reason: HOSPADM

## 2023-07-25 RX ORDER — NICOTINE POLACRILEX 4 MG
15-30 LOZENGE BUCCAL
Status: DISCONTINUED | OUTPATIENT
Start: 2023-07-25 | End: 2023-07-25 | Stop reason: HOSPADM

## 2023-07-25 RX ORDER — PROCHLORPERAZINE MALEATE 10 MG
10 TABLET ORAL EVERY 6 HOURS PRN
Status: DISCONTINUED | OUTPATIENT
Start: 2023-07-25 | End: 2023-07-25 | Stop reason: HOSPADM

## 2023-07-25 RX ORDER — LIDOCAINE HYDROCHLORIDE 20 MG/ML
JELLY TOPICAL ONCE
Status: COMPLETED | OUTPATIENT
Start: 2023-07-25 | End: 2023-07-25

## 2023-07-25 RX ORDER — METHYLERGONOVINE MALEATE 0.2 MG/ML
200 INJECTION INTRAVENOUS
Status: DISCONTINUED | OUTPATIENT
Start: 2023-07-25 | End: 2023-07-26 | Stop reason: HOSPADM

## 2023-07-25 RX ORDER — MISOPROSTOL 200 UG/1
800 TABLET ORAL
Status: DISCONTINUED | OUTPATIENT
Start: 2023-07-25 | End: 2023-07-25 | Stop reason: HOSPADM

## 2023-07-25 RX ORDER — IBUPROFEN 800 MG/1
800 TABLET, FILM COATED ORAL
Status: DISCONTINUED | OUTPATIENT
Start: 2023-07-25 | End: 2023-07-26 | Stop reason: HOSPADM

## 2023-07-25 RX ORDER — NALOXONE HYDROCHLORIDE 0.4 MG/ML
0.4 INJECTION, SOLUTION INTRAMUSCULAR; INTRAVENOUS; SUBCUTANEOUS
Status: DISCONTINUED | OUTPATIENT
Start: 2023-07-25 | End: 2023-07-25 | Stop reason: HOSPADM

## 2023-07-25 RX ORDER — HYDROCORTISONE 25 MG/G
CREAM TOPICAL 3 TIMES DAILY PRN
Status: DISCONTINUED | OUTPATIENT
Start: 2023-07-25 | End: 2023-07-26 | Stop reason: HOSPADM

## 2023-07-25 RX ORDER — DOCUSATE SODIUM 100 MG/1
100 CAPSULE, LIQUID FILLED ORAL DAILY
Status: DISCONTINUED | OUTPATIENT
Start: 2023-07-25 | End: 2023-07-26 | Stop reason: HOSPADM

## 2023-07-25 RX ORDER — CARBOPROST TROMETHAMINE 250 UG/ML
250 INJECTION, SOLUTION INTRAMUSCULAR
Status: DISCONTINUED | OUTPATIENT
Start: 2023-07-25 | End: 2023-07-26 | Stop reason: HOSPADM

## 2023-07-25 RX ORDER — MODIFIED LANOLIN
OINTMENT (GRAM) TOPICAL
Status: DISCONTINUED | OUTPATIENT
Start: 2023-07-25 | End: 2023-07-26 | Stop reason: HOSPADM

## 2023-07-25 RX ORDER — OXYTOCIN 10 [USP'U]/ML
10 INJECTION, SOLUTION INTRAMUSCULAR; INTRAVENOUS
Status: DISCONTINUED | OUTPATIENT
Start: 2023-07-25 | End: 2023-07-26 | Stop reason: HOSPADM

## 2023-07-25 RX ORDER — DEXTROSE MONOHYDRATE 25 G/50ML
25-50 INJECTION, SOLUTION INTRAVENOUS
Status: DISCONTINUED | OUTPATIENT
Start: 2023-07-25 | End: 2023-07-25 | Stop reason: HOSPADM

## 2023-07-25 RX ORDER — IBUPROFEN 800 MG/1
800 TABLET, FILM COATED ORAL EVERY 6 HOURS PRN
Status: DISCONTINUED | OUTPATIENT
Start: 2023-07-25 | End: 2023-07-26 | Stop reason: HOSPADM

## 2023-07-25 RX ORDER — METOCLOPRAMIDE HYDROCHLORIDE 5 MG/ML
10 INJECTION INTRAMUSCULAR; INTRAVENOUS EVERY 6 HOURS PRN
Status: DISCONTINUED | OUTPATIENT
Start: 2023-07-25 | End: 2023-07-25 | Stop reason: HOSPADM

## 2023-07-25 RX ORDER — KETOROLAC TROMETHAMINE 30 MG/ML
30 INJECTION, SOLUTION INTRAMUSCULAR; INTRAVENOUS
Status: DISCONTINUED | OUTPATIENT
Start: 2023-07-25 | End: 2023-07-26 | Stop reason: HOSPADM

## 2023-07-25 RX ORDER — MISOPROSTOL 200 UG/1
400 TABLET ORAL
Status: DISCONTINUED | OUTPATIENT
Start: 2023-07-25 | End: 2023-07-26 | Stop reason: HOSPADM

## 2023-07-25 RX ORDER — OXYTOCIN/0.9 % SODIUM CHLORIDE 30/500 ML
100-340 PLASTIC BAG, INJECTION (ML) INTRAVENOUS CONTINUOUS PRN
Status: DISCONTINUED | OUTPATIENT
Start: 2023-07-25 | End: 2023-07-26 | Stop reason: HOSPADM

## 2023-07-25 RX ORDER — PROCHLORPERAZINE 25 MG
25 SUPPOSITORY, RECTAL RECTAL EVERY 12 HOURS PRN
Status: DISCONTINUED | OUTPATIENT
Start: 2023-07-25 | End: 2023-07-25 | Stop reason: HOSPADM

## 2023-07-25 RX ORDER — METHYLERGONOVINE MALEATE 0.2 MG/ML
200 INJECTION INTRAVENOUS
Status: DISCONTINUED | OUTPATIENT
Start: 2023-07-25 | End: 2023-07-25 | Stop reason: HOSPADM

## 2023-07-25 RX ORDER — OXYTOCIN 10 [USP'U]/ML
10 INJECTION, SOLUTION INTRAMUSCULAR; INTRAVENOUS
Status: COMPLETED | OUTPATIENT
Start: 2023-07-25 | End: 2023-07-25

## 2023-07-25 RX ORDER — MISOPROSTOL 200 UG/1
800 TABLET ORAL
Status: DISCONTINUED | OUTPATIENT
Start: 2023-07-25 | End: 2023-07-26 | Stop reason: HOSPADM

## 2023-07-25 RX ORDER — CARBOPROST TROMETHAMINE 250 UG/ML
250 INJECTION, SOLUTION INTRAMUSCULAR
Status: DISCONTINUED | OUTPATIENT
Start: 2023-07-25 | End: 2023-07-25 | Stop reason: HOSPADM

## 2023-07-25 RX ORDER — ONDANSETRON 2 MG/ML
4 INJECTION INTRAMUSCULAR; INTRAVENOUS EVERY 6 HOURS PRN
Status: DISCONTINUED | OUTPATIENT
Start: 2023-07-25 | End: 2023-07-25 | Stop reason: HOSPADM

## 2023-07-25 RX ORDER — ACETAMINOPHEN 325 MG/1
650 TABLET ORAL EVERY 4 HOURS PRN
Status: DISCONTINUED | OUTPATIENT
Start: 2023-07-25 | End: 2023-07-26 | Stop reason: HOSPADM

## 2023-07-25 RX ORDER — ONDANSETRON 4 MG/1
4 TABLET, ORALLY DISINTEGRATING ORAL EVERY 6 HOURS PRN
Status: DISCONTINUED | OUTPATIENT
Start: 2023-07-25 | End: 2023-07-25 | Stop reason: HOSPADM

## 2023-07-25 RX ORDER — BISACODYL 10 MG
10 SUPPOSITORY, RECTAL RECTAL DAILY PRN
Status: DISCONTINUED | OUTPATIENT
Start: 2023-07-25 | End: 2023-07-26 | Stop reason: HOSPADM

## 2023-07-25 RX ORDER — MISOPROSTOL 200 UG/1
400 TABLET ORAL
Status: DISCONTINUED | OUTPATIENT
Start: 2023-07-25 | End: 2023-07-25 | Stop reason: HOSPADM

## 2023-07-25 RX ORDER — OXYTOCIN/0.9 % SODIUM CHLORIDE 30/500 ML
340 PLASTIC BAG, INJECTION (ML) INTRAVENOUS CONTINUOUS PRN
Status: DISCONTINUED | OUTPATIENT
Start: 2023-07-25 | End: 2023-07-26 | Stop reason: HOSPADM

## 2023-07-25 RX ORDER — LIDOCAINE 40 MG/G
CREAM TOPICAL
Status: DISCONTINUED | OUTPATIENT
Start: 2023-07-25 | End: 2023-07-25 | Stop reason: HOSPADM

## 2023-07-25 RX ORDER — CITRIC ACID/SODIUM CITRATE 334-500MG
30 SOLUTION, ORAL ORAL
Status: DISCONTINUED | OUTPATIENT
Start: 2023-07-25 | End: 2023-07-25 | Stop reason: HOSPADM

## 2023-07-25 RX ORDER — OXYTOCIN/0.9 % SODIUM CHLORIDE 30/500 ML
340 PLASTIC BAG, INJECTION (ML) INTRAVENOUS CONTINUOUS PRN
Status: DISCONTINUED | OUTPATIENT
Start: 2023-07-25 | End: 2023-07-25 | Stop reason: HOSPADM

## 2023-07-25 RX ADMIN — MISOPROSTOL 800 MCG: 200 TABLET ORAL at 04:31

## 2023-07-25 RX ADMIN — ACETAMINOPHEN 650 MG: 325 TABLET ORAL at 05:01

## 2023-07-25 RX ADMIN — OXYTOCIN 10 UNITS: 10 INJECTION INTRAVENOUS at 04:24

## 2023-07-25 RX ADMIN — IBUPROFEN 800 MG: 800 TABLET ORAL at 23:46

## 2023-07-25 RX ADMIN — IBUPROFEN 800 MG: 800 TABLET ORAL at 11:09

## 2023-07-25 RX ADMIN — LIDOCAINE HYDROCHLORIDE: 20 JELLY TOPICAL at 05:51

## 2023-07-25 RX ADMIN — LIDOCAINE HYDROCHLORIDE 10 ML: 10 INJECTION, SOLUTION INFILTRATION; PERINEURAL at 04:42

## 2023-07-25 RX ADMIN — IBUPROFEN 800 MG: 800 TABLET ORAL at 05:01

## 2023-07-25 RX ADMIN — IBUPROFEN 800 MG: 800 TABLET ORAL at 17:04

## 2023-07-25 RX ADMIN — BENZOCAINE AND LEVOMENTHOL: 200; 5 SPRAY TOPICAL at 05:36

## 2023-07-25 RX ADMIN — DOCUSATE SODIUM 100 MG: 100 CAPSULE, LIQUID FILLED ORAL at 08:15

## 2023-07-25 RX ADMIN — WITCH HAZEL: 500 SOLUTION RECTAL; TOPICAL at 05:36

## 2023-07-25 ASSESSMENT — ACTIVITIES OF DAILY LIVING (ADL)
CONCENTRATING,_REMEMBERING_OR_MAKING_DECISIONS_DIFFICULTY: NO
ADLS_ACUITY_SCORE: 18
DOING_ERRANDS_INDEPENDENTLY_DIFFICULTY: NO
ADLS_ACUITY_SCORE: 18
TOILETING_ISSUES: NO
CHANGE_IN_FUNCTIONAL_STATUS_SINCE_ONSET_OF_CURRENT_ILLNESS/INJURY: NO
ADLS_ACUITY_SCORE: 18
ADLS_ACUITY_SCORE: 18
WEAR_GLASSES_OR_BLIND: NO
DIFFICULTY_EATING/SWALLOWING: NO
ADLS_ACUITY_SCORE: 18
WALKING_OR_CLIMBING_STAIRS_DIFFICULTY: NO
DRESSING/BATHING_DIFFICULTY: NO
FALL_HISTORY_WITHIN_LAST_SIX_MONTHS: NO

## 2023-07-25 NOTE — L&D DELIVERY NOTE
VAGINAL DELIVERY NOTE    PRE DELIVERY DIAGNOSIS  1) Intrauterine pregnancy at 38w0d   2) GDMA2      POST DELIVERY DIAGNOSIS  1) Intrauterine pregnancy at 38w0d   2) Delivery of a viable male.  3) Apgars: 8  and 9  4) weight:pending    Delivery Method/Type:       PROVIDER:   Veronica Weiss DO, FACOG     ANESTHESIA: None    COMPLICATIONS: None    DESCRIPTION OF PROCEDURE  Holly Harrell is a 23 year old  with prenatal care with Dr. Mitchell at [facility] who was admitted at 4cm for labor.  Patient had a .  Delayed cord clamping performed.  No gross fetal defects were observed. Pitocin was administered. Placenta delivered intact with 3 vessel cord. The perineum was then evaluated and noted to have a second degree laceration that was repaired in the usual manner with 3-0 Vicryl.   She also had a periurethral tear on the L side that was repaired as well.   Patient was extremely uncomfortable, therefore, small bleeding sites were packed with half a roll of gauze vaginally.   Delivery QBL (mL): 400     Veronica Weiss DO, FACOG

## 2023-07-25 NOTE — PROGRESS NOTES
OB Post Partum Note    ASSESSMENT: PLAN:     PPD#0   spontaneous vaginal delivery  GDMA2- will check glucose in am   Doing well  Routine cares  Anticipate discharge to home in am    SUBJECTIVE:   no complaints, denies fever, chills.  Tolerating po, ambulating.  Baby doing well    OBJECTIVE:    BP 98/55 (BP Location: Left arm, Patient Position: Semi-Biggs's, Cuff Size: Adult Regular)   Temp 98.7  F (37.1  C) (Oral)   Resp 20   LMP 10/05/2022 (Within Days)   Breastfeeding Unknown       abd- fundus firm  Ext- no tenderness,   cv- regular rate  Lungs- clear    Marcie Antoine MD  [facility]  [phone]

## 2023-07-25 NOTE — H&P
OB HISTORY AND PHYSICAL UPDATE ADMISSION EXAM    Persi Paw  2000  [id number]    HPI: 22yo  @ 38wks.  Here in active labor.  GDMA2.  GBS neg.  [place]      Estimated Date of Delivery: Aug 8, 2023                       EGA 38w0d    OB History    Para Term  AB Living   2 1 1 0 0 1   SAB IAB Ectopic Multiple Live Births   0 0 0 0 1      # Outcome Date GA Lbr Simon/2nd Weight Sex Delivery Anes PTL Lv   2 Current            1 Term 19 37w4d 04:10  02:09 3.43 kg (7 lb 9 oz) M Vag-Spont Local, Nitrous, IV N HERACLIO      Birth Comments: NCB, laceration, well healed, bottle only      Name: Shemar West      Apgar1: 9  Apgar5: 9       Prenatal Complications Diabetes    Exam:        LMP 10/05/2022 (Within Days)         HEENT          WNL              Heart              WNL               Lungs             WNL                      Abdomen        WNL                       Extremities     WNL                     Vaginal exam 5cm per RN      Fetal Status   Category 1    Assessment: Labor    Plan: Spontaneous labor, anticipate vaginal delivery    Veronica Weiss DO, FACOG  2023 2:15 AM

## 2023-07-25 NOTE — PROGRESS NOTES
Pt just voided in toilet, bladder scan 223 ml. Pt would like to wait 5-10 minutes until trying to void again before utilizing straight cath to empty bladder. Educated on urinary retention protocol.

## 2023-07-25 NOTE — PLAN OF CARE
Goal Outcome Evaluation:      Plan of Care Reviewed With: patient    Overall Patient Progress: improvingOverall Patient Progress: improving    Outcome Evaluation: pain less than 3, rest    Patient was awake most of the night and has now been up to bath and void. She is running a low BP but tolerates activity well. She is voiding in adequate amounts and pain is controlled using ibuprofen and had tylenol earlier this am. Holly is able to rest between cares for baby.

## 2023-07-25 NOTE — PROGRESS NOTES
Updated Dr. Figueroa that patient was gestational diabetic and did a glucose check this morning which was 144 . Per Dr. Figueroa no need to check blood sugars anymore if pt was gestational diabetic.

## 2023-07-25 NOTE — PLAN OF CARE
VSS. Able to void a small amount; see previous note. Bleeding WDL. Bledsoe towards infant. SO at bedside and supportive. PRN pain medications given.   Problem: Postpartum (Vaginal Delivery)  Goal: Successful Maternal Role Transition  Outcome: Progressing  Goal: Hemostasis  Outcome: Progressing  Goal: Absence of Infection Signs and Symptoms  Outcome: Progressing  Goal: Anesthesia/Sedation Recovery  Outcome: Progressing  Goal: Optimal Pain Control and Function  Outcome: Progressing  Intervention: Prevent or Manage Pain    Goal: Effective Urinary Elimination  Outcome: Progressing

## 2023-07-26 VITALS
RESPIRATION RATE: 20 BRPM | OXYGEN SATURATION: 98 % | SYSTOLIC BLOOD PRESSURE: 97 MMHG | DIASTOLIC BLOOD PRESSURE: 57 MMHG | TEMPERATURE: 98.1 F | HEART RATE: 79 BPM

## 2023-07-26 LAB
GLUCOSE SERPL-MCNC: 98 MG/DL (ref 70–99)
HOLD SPECIMEN: NORMAL

## 2023-07-26 PROCEDURE — 36415 COLL VENOUS BLD VENIPUNCTURE: CPT | Performed by: OBSTETRICS & GYNECOLOGY

## 2023-07-26 PROCEDURE — 250N000013 HC RX MED GY IP 250 OP 250 PS 637: Performed by: OBSTETRICS & GYNECOLOGY

## 2023-07-26 PROCEDURE — 82947 ASSAY GLUCOSE BLOOD QUANT: CPT | Performed by: OBSTETRICS & GYNECOLOGY

## 2023-07-26 RX ORDER — SENNA AND DOCUSATE SODIUM 50; 8.6 MG/1; MG/1
1 TABLET, FILM COATED ORAL AT BEDTIME
Qty: 60 TABLET | Refills: 0 | Status: SHIPPED | OUTPATIENT
Start: 2023-07-26

## 2023-07-26 RX ORDER — ACETAMINOPHEN 500 MG
500-1000 TABLET ORAL EVERY 6 HOURS PRN
Qty: 60 TABLET | Refills: 0 | Status: SHIPPED | OUTPATIENT
Start: 2023-07-26

## 2023-07-26 RX ORDER — IBUPROFEN 600 MG/1
600 TABLET, FILM COATED ORAL EVERY 6 HOURS PRN
Qty: 40 TABLET | Refills: 0 | Status: SHIPPED | OUTPATIENT
Start: 2023-07-26

## 2023-07-26 RX ADMIN — DOCUSATE SODIUM 100 MG: 100 CAPSULE, LIQUID FILLED ORAL at 08:02

## 2023-07-26 ASSESSMENT — ACTIVITIES OF DAILY LIVING (ADL)
ADLS_ACUITY_SCORE: 18

## 2023-07-26 NOTE — PROGRESS NOTES
[facility]irthplace RN Care Coordinator Note    Holly Harrell  [id number]  2000    Chart reviewed, discharge follow-up plan discussed with patient's bedside RN, needs assessed. Post-delivery follow-up appointment planned in 6 weeks at [facility]. Home care nurse visit not ordered by discharging provider; patient's insurance plan not currently accepted by home care agency, [facility].    Patient is reported to have support at home and is ready to discharge today with . RN Care Coordinator will continue to follow and assist with discharge planning as needed.

## 2023-07-26 NOTE — DISCHARGE INSTRUCTIONS
*Follow up with [facility]  in 6 weeks       Warning Signs after Having a Baby    Keep this paper on your fridge or somewhere else where you can see it.    Call your provider if you have any of these symptoms up to 12 weeks after having your baby.    Thoughts of hurting yourself or your baby  Pain in your chest or trouble breathing  Severe headache not helped by pain medicine  Eyesight concerns (blurry vision, seeing spots or flashes of light, other changes to eyesight)  Fainting, shaking or other signs of a seizure    Call 9-1-1 if you feel that it is an emergency.     The symptoms below can happen to anyone after giving birth. They can be very serious. Call your provider if you have any of these warning signs.    My provider s phone number: _______________________    Losing too much blood (hemorrhage)    Call your provider if you soak through a pad in less than an hour or pass blood clots bigger than a golf ball. These may be signs that you are bleeding too much.    Blood clots in the legs or lungs    After you give birth, your body naturally clots its blood to help prevent blood loss. Sometimes this increased clotting can happen in other areas of the body, like the legs or lungs. This can block your blood flow and be very dangerous.     Call your provider if you:  Have a red, swollen spot on the back of your leg that is warm or painful when you touch it.   Are coughing up blood.     Infection    Call your provider if you have any of these symptoms:  Fever of 100.4 F (38 C) or higher.  Pain or redness around your stitches if you had an incision.   Any yellow, white, or green fluid coming from places where you had stitches or surgery.    Mood Problems (postpartum depression)    Many people feel sad or have mood changes after having a baby. But for some people, these mood swings are worse.     Call your provider right away if you feel so anxious or nervous that you can't care for yourself or your  baby.    Preeclampsia (high blood pressure)    Even if you didn't have high blood pressure when you were pregnant, you are at risk for the high blood pressure disease called preeclampsia. This risk can last up to 12 weeks after giving birth.     Call your provider if you have:   Pain on your right side under your rib cage  Sudden swelling in the hands and face    Remember: You know your body. If something doesn't feel right, get medical help.     For informational purposes only. Not to replace the advice of your health care provider. Copyright 2020 [facility]. All rights reserved. Clinically reviewed by Judi Yang, RNC-OB, MSN. [employer] [id number] - Rev 02/23.

## 2023-07-26 NOTE — PLAN OF CARE
Problem: Postpartum (Vaginal Delivery)  Goal: Successful Maternal Role Transition  Outcome: Met   Goal Outcome Evaluation:      Plan of Care Reviewed With: patient    Overall Patient Progress: improvingOverall Patient Progress: improving    Outcome Evaluation: pain less than 3, rest    Patient ready for discharge to home today. Reviewed discharge instructions, safe use of home medications and warning signs to call the doctor. Patient verbalized understanding. Home with spouse and  son.

## 2023-07-26 NOTE — DISCHARGE SUMMARY
[facility] Discharge Summary    Holly Harrell MRN# [id number]   Age: 23 year old YOB: 2000     Date of Admission:  2023  Date of Discharge::  2023  Admitting Physician:  Sejal Mitchell DO  Discharge Physician:  Marcie Antoine MD     [facility]            Admission Diagnoses:   Gestational diabetes mellitus [O24.419]  Labor            Discharge Diagnosis:     Normal spontaneous vaginal delivery  Intrauterine pregnancy at 38 weeks gestation          Procedures:     Procedure(s): spontaneous vaginal delivery                    Medications Prior to Admission:     Medications Prior to Admission   Medication Sig Dispense Refill Last Dose    acetone urine (KETOSTIX) test strip Use to test urine once daily in the morning. 50 strip 1     Alcohol Swabs PADS 1 each daily 100 each 1     CONTOUR NEXT TEST test strip Use to test blood sugar four times daily. 200 strip 3     Insulin Pen Needle (PEN NEEDLES) 32G X 4 MM MISC 1 each daily 100 each 1     Microlet Lancets MISC 100 each 4 times daily Use to test blood sugar four times daily. 100 each 4     [DISCONTINUED] insulin detemir (LEVEMIR FLEXTOUCH) 100 UNIT/ML pen Inject 8 Units Subcutaneous At Bedtime 15 mL 1 2023             Discharge Medications:     Current Discharge Medication List        START taking these medications    Details   acetaminophen (TYLENOL) 500 MG tablet Take 1-2 tablets (500-1,000 mg) by mouth every 6 hours as needed  Qty: 60 tablet, Refills: 0    Associated Diagnoses:  (normal spontaneous vaginal delivery)      ibuprofen (ADVIL/MOTRIN) 600 MG tablet Take 1 tablet (600 mg) by mouth every 6 hours as needed  Qty: 40 tablet, Refills: 0    Associated Diagnoses:  (normal spontaneous vaginal delivery)      SENNA-docusate sodium (SENNA S) 8.6-50 MG tablet Take 1 tablet by mouth At Bedtime  Qty: 60 tablet, Refills: 0    Associated Diagnoses:  (normal spontaneous vaginal delivery)            CONTINUE these medications which have NOT CHANGED    Details   acetone urine (KETOSTIX) test strip Use to test urine once daily in the morning.  Qty: 50 strip, Refills: 1    Associated Diagnoses: Diet controlled gestational diabetes mellitus (GDM) in third trimester      Alcohol Swabs PADS 1 each daily  Qty: 100 each, Refills: 1    Associated Diagnoses: Insulin controlled gestational diabetes mellitus (GDM) during pregnancy, antepartum      CONTOUR NEXT TEST test strip Use to test blood sugar four times daily.  Qty: 200 strip, Refills: 3    Associated Diagnoses: Diet controlled gestational diabetes mellitus (GDM) in third trimester      Insulin Pen Needle (PEN NEEDLES) 32G X 4 MM MISC 1 each daily  Qty: 100 each, Refills: 1    Associated Diagnoses: Insulin controlled gestational diabetes mellitus (GDM) during pregnancy, antepartum      Microlet Lancets MISC 100 each 4 times daily Use to test blood sugar four times daily.  Qty: 100 each, Refills: 4    Associated Diagnoses: Diet controlled gestational diabetes mellitus (GDM) in third trimester           STOP taking these medications       insulin detemir (LEVEMIR FLEXTOUCH) 100 UNIT/ML pen Comments:   Reason for Stopping:                     Consultations:   No consultations were requested during this admission          Brief History of Labor:   The patient was admitted in labor,  she progressed through normal labor curve to complete.  spontaneous vaginal delivery without complications              Hospital Course:   The patient's hospital course was unremarkable.  On discharge, her pain was well controlled. Vaginal bleeding is similar to peak menstrual flow.  Voiding without difficulty.  Ambulating well and tolerating a normal diet.  No fever.  Breastfeeding well.  Infant is stable.  No bowel movement yet.*  She was discharged on post-partum day #1.    Post-partum hemoglobin:   Hemoglobin   Date Value Ref Range Status   07/25/2023 11.0 (L) 11.7 - 15.7 g/dL  Final             Discharge Instructions and Follow-Up:     Discharge diet: Regular   Discharge activity: No sex for 6 week(s)   Discharge follow-up: Follow up with [facility]  in 6 weeks   Wound care:            Discharge Disposition:     Discharged to home      Attestation:  I have reviewed today's vital signs, notes, medications, labs and imaging.    Marcie Antoine MD

## 2023-07-26 NOTE — PLAN OF CARE
Problem: Plan of Care - These are the overarching goals to be used throughout the patient stay.    Goal: Plan of Care Review  Description: The Plan of Care Review/Shift note should be completed every shift.  The Outcome Evaluation is a brief statement about your assessment that the patient is improving, declining, or no change.  This information will be displayed automatically on your shift note.  Outcome: Met     Patient vitals and assessments wnl. Fundus is firm, lochia controlled. Patient has cramping, rated pain at a consistent 5. Ibuprofen given, with tucks/spray/aqua k pad/binder for comfort.

## 2023-07-26 NOTE — PLAN OF CARE
Patient vital signs and assessment findings were WNL. Patient fundus is firm at U/1 and bleeding is light. Patient is ambulating independently and voiding without difficulty. Patient is bonding well with infant. Pain is being managed with ibuprofen. Patient is using peribottle and spray for perineum discomfort.     Nyasia Stauffer RN  7/26/2023 6:54 AM

## 2023-09-06 ENCOUNTER — TELEPHONE (OUTPATIENT)
Dept: FAMILY MEDICINE | Facility: CLINIC | Age: 23
End: 2023-09-06
Payer: COMMERCIAL

## 2023-09-06 NOTE — TELEPHONE ENCOUNTER
General Call    Contacts         Type Contact Phone/Fax    09/06/2023 11:43 AM CDT Phone (Incoming) Holly Harrell (Self) [phone] (M)          Reason for Call:  was supposed to get a call for specialist vera    What are your questions or concerns:  Specialist for breathing, swallowing - not sure which specialist or location - Was told that someone would give them a call but received no call for a wk. Would like to discuss with care team about this.    Date of last appointment with provider: n/a    Could we send this information to you in getbetter! or would you prefer to receive a phone call?:   Patient would prefer a phone call   Okay to leave a detailed message?: Yes at Cell number on file:    Telephone Information:   Mobile [phone]

## 2023-09-07 NOTE — TELEPHONE ENCOUNTER
Called Holly back - she states this was in reference to her son, Leonardo West : 22. Further information can be found in his medical record.

## 2023-10-01 PROBLEM — S31.41XA LACERATION WITHOUT FOREIGN BODY OF VAGINA AND VULVA, INITIAL ENCOUNTER: Status: ACTIVE | Noted: 2023-07-06

## 2023-10-15 ENCOUNTER — HEALTH MAINTENANCE LETTER (OUTPATIENT)
Age: 23
End: 2023-10-15

## 2024-12-08 ENCOUNTER — HEALTH MAINTENANCE LETTER (OUTPATIENT)
Age: 24
End: 2024-12-08
